# Patient Record
Sex: FEMALE | Race: WHITE | Employment: OTHER | URBAN - METROPOLITAN AREA
[De-identification: names, ages, dates, MRNs, and addresses within clinical notes are randomized per-mention and may not be internally consistent; named-entity substitution may affect disease eponyms.]

---

## 2023-09-01 ENCOUNTER — OFFICE VISIT (OUTPATIENT)
Dept: FAMILY MEDICINE CLINIC | Age: 68
End: 2023-09-01

## 2023-09-01 VITALS
SYSTOLIC BLOOD PRESSURE: 110 MMHG | RESPIRATION RATE: 16 BRPM | HEART RATE: 86 BPM | OXYGEN SATURATION: 98 % | BODY MASS INDEX: 25.3 KG/M2 | DIASTOLIC BLOOD PRESSURE: 83 MMHG | HEIGHT: 61 IN | WEIGHT: 134 LBS | TEMPERATURE: 98.1 F

## 2023-09-01 DIAGNOSIS — Z72.0 TOBACCO ABUSE: ICD-10-CM

## 2023-09-01 DIAGNOSIS — Z11.59 NEED FOR HEPATITIS C SCREENING TEST: ICD-10-CM

## 2023-09-01 DIAGNOSIS — K21.9 GASTROESOPHAGEAL REFLUX DISEASE, UNSPECIFIED WHETHER ESOPHAGITIS PRESENT: ICD-10-CM

## 2023-09-01 DIAGNOSIS — Z85.3 HX: BREAST CANCER: ICD-10-CM

## 2023-09-01 DIAGNOSIS — B96.89 ACUTE BACTERIAL SINUSITIS: Primary | ICD-10-CM

## 2023-09-01 DIAGNOSIS — Z13.220 LIPID SCREENING: ICD-10-CM

## 2023-09-01 DIAGNOSIS — Z87.891 PERSONAL HISTORY OF TOBACCO USE: ICD-10-CM

## 2023-09-01 DIAGNOSIS — Z76.0 MEDICATION REFILL: ICD-10-CM

## 2023-09-01 DIAGNOSIS — Z86.39 HX OF OBESITY: ICD-10-CM

## 2023-09-01 DIAGNOSIS — J01.90 ACUTE BACTERIAL SINUSITIS: Primary | ICD-10-CM

## 2023-09-01 DIAGNOSIS — Z13.1 DIABETES MELLITUS SCREENING: ICD-10-CM

## 2023-09-01 DIAGNOSIS — N95.2 ATROPHIC VAGINITIS: ICD-10-CM

## 2023-09-01 DIAGNOSIS — Z78.0 POSTMENOPAUSAL: ICD-10-CM

## 2023-09-01 LAB
CHOLESTEROL: 237 MG/DL
HBA1C MFR BLD: 5.6 % (ref 4–5.6)
HDLC SERPL-MCNC: 60 MG/DL
LDL CHOLESTEROL: 155 MG/DL
TRIGL SERPL-MCNC: 112 MG/DL
VLDLC SERPL CALC-MCNC: 22 MG/DL

## 2023-09-01 RX ORDER — AMOXICILLIN AND CLAVULANATE POTASSIUM 875; 125 MG/1; MG/1
1 TABLET, FILM COATED ORAL 2 TIMES DAILY
Qty: 14 TABLET | Refills: 0 | Status: SHIPPED | OUTPATIENT
Start: 2023-09-01 | End: 2023-09-08

## 2023-09-01 RX ORDER — FLUTICASONE PROPIONATE 50 MCG
2 SPRAY, SUSPENSION (ML) NASAL DAILY
Qty: 48 G | Refills: 1 | Status: SHIPPED | OUTPATIENT
Start: 2023-09-01

## 2023-09-01 RX ORDER — CONJUGATED ESTROGENS 0.62 MG/G
CREAM VAGINAL
Qty: 30 G | Refills: 0 | Status: SHIPPED | OUTPATIENT
Start: 2023-09-01

## 2023-09-01 RX ORDER — CIPROFLOXACIN AND DEXAMETHASONE 3; 1 MG/ML; MG/ML
4 SUSPENSION/ DROPS AURICULAR (OTIC) AS NEEDED
COMMUNITY

## 2023-09-01 RX ORDER — ALBUTEROL SULFATE 90 UG/1
2 AEROSOL, METERED RESPIRATORY (INHALATION) 4 TIMES DAILY PRN
Qty: 18 G | Refills: 0 | Status: SHIPPED | OUTPATIENT
Start: 2023-09-01

## 2023-09-01 RX ORDER — PANTOPRAZOLE SODIUM 40 MG/1
40 TABLET, DELAYED RELEASE ORAL DAILY
Qty: 90 TABLET | Refills: 1 | Status: SHIPPED | OUTPATIENT
Start: 2023-09-01

## 2023-09-01 SDOH — ECONOMIC STABILITY: FOOD INSECURITY: WITHIN THE PAST 12 MONTHS, YOU WORRIED THAT YOUR FOOD WOULD RUN OUT BEFORE YOU GOT MONEY TO BUY MORE.: NEVER TRUE

## 2023-09-01 SDOH — ECONOMIC STABILITY: HOUSING INSECURITY
IN THE LAST 12 MONTHS, WAS THERE A TIME WHEN YOU DID NOT HAVE A STEADY PLACE TO SLEEP OR SLEPT IN A SHELTER (INCLUDING NOW)?: NO

## 2023-09-01 SDOH — ECONOMIC STABILITY: FOOD INSECURITY: WITHIN THE PAST 12 MONTHS, THE FOOD YOU BOUGHT JUST DIDN'T LAST AND YOU DIDN'T HAVE MONEY TO GET MORE.: NEVER TRUE

## 2023-09-01 SDOH — ECONOMIC STABILITY: INCOME INSECURITY: HOW HARD IS IT FOR YOU TO PAY FOR THE VERY BASICS LIKE FOOD, HOUSING, MEDICAL CARE, AND HEATING?: SOMEWHAT HARD

## 2023-09-01 ASSESSMENT — ENCOUNTER SYMPTOMS
COUGH: 1
WHEEZING: 1
SHORTNESS OF BREATH: 1

## 2023-09-01 ASSESSMENT — PATIENT HEALTH QUESTIONNAIRE - PHQ9
8. MOVING OR SPEAKING SO SLOWLY THAT OTHER PEOPLE COULD HAVE NOTICED. OR THE OPPOSITE, BEING SO FIGETY OR RESTLESS THAT YOU HAVE BEEN MOVING AROUND A LOT MORE THAN USUAL: 0
9. THOUGHTS THAT YOU WOULD BE BETTER OFF DEAD, OR OF HURTING YOURSELF: 0
4. FEELING TIRED OR HAVING LITTLE ENERGY: 0
6. FEELING BAD ABOUT YOURSELF - OR THAT YOU ARE A FAILURE OR HAVE LET YOURSELF OR YOUR FAMILY DOWN: 0
2. FEELING DOWN, DEPRESSED OR HOPELESS: 1
SUM OF ALL RESPONSES TO PHQ9 QUESTIONS 1 & 2: 1
SUM OF ALL RESPONSES TO PHQ QUESTIONS 1-9: 3
1. LITTLE INTEREST OR PLEASURE IN DOING THINGS: 0
5. POOR APPETITE OR OVEREATING: 0
SUM OF ALL RESPONSES TO PHQ QUESTIONS 1-9: 3
SUM OF ALL RESPONSES TO PHQ QUESTIONS 1-9: 3
10. IF YOU CHECKED OFF ANY PROBLEMS, HOW DIFFICULT HAVE THESE PROBLEMS MADE IT FOR YOU TO DO YOUR WORK, TAKE CARE OF THINGS AT HOME, OR GET ALONG WITH OTHER PEOPLE: 0
7. TROUBLE CONCENTRATING ON THINGS, SUCH AS READING THE NEWSPAPER OR WATCHING TELEVISION: 1
3. TROUBLE FALLING OR STAYING ASLEEP: 1
SUM OF ALL RESPONSES TO PHQ QUESTIONS 1-9: 3

## 2023-09-01 NOTE — PROGRESS NOTES
This patient was screened with SUBS screening tool.    On 9/1/2023 the patient has a Positive Screen and the result can be found scanned into the chart

## 2023-09-06 ENCOUNTER — TELEPHONE (OUTPATIENT)
Dept: BREAST CENTER | Age: 68
End: 2023-09-06

## 2023-09-06 ASSESSMENT — ENCOUNTER SYMPTOMS
CHEST TIGHTNESS: 0
NAUSEA: 0
RHINORRHEA: 1
SORE THROAT: 0
BACK PAIN: 1
VOMITING: 0
DIARRHEA: 0
ABDOMINAL PAIN: 0

## 2023-09-06 NOTE — TELEPHONE ENCOUNTER
RN made first attempt to schedule patient for consult with [facility] breast clinic. Pt was referred to office for hx breast cancer, establish care by PCP office. RN left voicemail with office contact information for patient to call back and schedule referral appt. Was going to confirm if patient had reconstruction? When surgeries were? Who was surgeon? Where care previously was?         Electronically signed by Jairon Chowdhury RN on 9/6/23 at 3:07 PM EDT

## 2023-09-07 LAB — HEPATITIS C ANTIBODY: NONREACTIVE

## 2023-09-08 NOTE — TELEPHONE ENCOUNTER
RN received return call from patient. Patient had bilateral mastectomies with no recon and ALND on left side. Patient surgery was done in 2019 in [place]. Patient was scheduled with AMMY Milligan on 11/9/2023 @ 11am. Patient aware to bring her prior information for us to place in chart. Patient wants to talk about possible referral to plastic surgery to discuss reconstruction.       Electronically signed by Karson Chen RN on 9/8/23 at 10:34 AM EDT

## 2023-09-21 ENCOUNTER — TELEPHONE (OUTPATIENT)
Dept: FAMILY MEDICINE CLINIC | Age: 68
End: 2023-09-21

## 2023-09-21 NOTE — TELEPHONE ENCOUNTER
Patient injured her right hand and is in pain. She had surgery with Dr. Mary Garcia in 2015, but he has left his practice and moved out of town. She saw Dr. Mihir Mancera in September and is seeing Dr. Lei Hardy in October because she would like a D.O. She wonders if she has to wait until her visit on Oct. 10th, or can someone send a referral to a hand surgeon such as Dr. George Washburn.

## 2023-09-22 NOTE — TELEPHONE ENCOUNTER
Spoke with patient about needing to see Dr Edwina Moon so she can put in for an ortho referral. She said that she will wait for her appointment on October 10th.

## 2023-10-09 ENCOUNTER — HOSPITAL ENCOUNTER (OUTPATIENT)
Dept: PULMONOLOGY | Age: 68
Discharge: HOME | End: 2023-10-09
Payer: COMMERCIAL

## 2023-10-09 ENCOUNTER — HOSPITAL ENCOUNTER (OUTPATIENT)
Dept: CT IMAGING | Age: 68
Discharge: HOME | End: 2023-10-11
Payer: COMMERCIAL

## 2023-10-09 ENCOUNTER — HOSPITAL ENCOUNTER (OUTPATIENT)
Dept: MAMMOGRAPHY | Age: 68
Discharge: HOME | End: 2023-10-11
Payer: COMMERCIAL

## 2023-10-09 DIAGNOSIS — Z72.0 TOBACCO ABUSE: ICD-10-CM

## 2023-10-09 DIAGNOSIS — Z78.0 POSTMENOPAUSAL: ICD-10-CM

## 2023-10-09 PROCEDURE — 94729 DIFFUSING CAPACITY: CPT

## 2023-10-09 PROCEDURE — 71271 CT THORAX LUNG CANCER SCR C-: CPT

## 2023-10-09 PROCEDURE — 77080 DXA BONE DENSITY AXIAL: CPT

## 2023-10-09 PROCEDURE — 94060 EVALUATION OF WHEEZING: CPT

## 2023-10-09 PROCEDURE — 94726 PLETHYSMOGRAPHY LUNG VOLUMES: CPT

## 2023-10-10 ENCOUNTER — OFFICE VISIT (OUTPATIENT)
Dept: FAMILY MEDICINE CLINIC | Age: 68
End: 2023-10-10
Payer: COMMERCIAL

## 2023-10-10 ENCOUNTER — TELEPHONE (OUTPATIENT)
Dept: FAMILY MEDICINE CLINIC | Age: 68
End: 2023-10-10

## 2023-10-10 ENCOUNTER — TELEPHONE (OUTPATIENT)
Dept: CASE MANAGEMENT | Age: 68
End: 2023-10-10

## 2023-10-10 VITALS
TEMPERATURE: 97.4 F | RESPIRATION RATE: 18 BRPM | HEART RATE: 95 BPM | BODY MASS INDEX: 26.81 KG/M2 | WEIGHT: 142 LBS | OXYGEN SATURATION: 97 % | HEIGHT: 61 IN | DIASTOLIC BLOOD PRESSURE: 89 MMHG | SYSTOLIC BLOOD PRESSURE: 134 MMHG

## 2023-10-10 DIAGNOSIS — Z86.39 HISTORY OF VITAMIN D DEFICIENCY: ICD-10-CM

## 2023-10-10 DIAGNOSIS — Z00.00 INITIAL MEDICARE ANNUAL WELLNESS VISIT: Primary | ICD-10-CM

## 2023-10-10 DIAGNOSIS — Z91.89 CANDIDATE FOR STATIN THERAPY DUE TO RISK OF FUTURE CARDIOVASCULAR EVENT: ICD-10-CM

## 2023-10-10 DIAGNOSIS — Z91.89 STREPTOCOCCUS PNEUMONIAE VACCINATION INDICATED: ICD-10-CM

## 2023-10-10 DIAGNOSIS — Z98.890 HISTORY OF SURGERY ON ARM: ICD-10-CM

## 2023-10-10 DIAGNOSIS — K21.9 GASTROESOPHAGEAL REFLUX DISEASE, UNSPECIFIED WHETHER ESOPHAGITIS PRESENT: ICD-10-CM

## 2023-10-10 PROCEDURE — 90471 IMMUNIZATION ADMIN: CPT | Performed by: FAMILY MEDICINE

## 2023-10-10 PROCEDURE — 3074F SYST BP LT 130 MM HG: CPT

## 2023-10-10 PROCEDURE — G0438 PPPS, INITIAL VISIT: HCPCS

## 2023-10-10 PROCEDURE — 90677 PCV20 VACCINE IM: CPT | Performed by: FAMILY MEDICINE

## 2023-10-10 PROCEDURE — 1123F ACP DISCUSS/DSCN MKR DOCD: CPT

## 2023-10-10 PROCEDURE — 3078F DIAST BP <80 MM HG: CPT

## 2023-10-10 RX ORDER — ROSUVASTATIN CALCIUM 10 MG/1
10 TABLET, COATED ORAL NIGHTLY
Qty: 30 TABLET | Refills: 2 | Status: SHIPPED | OUTPATIENT
Start: 2023-10-10

## 2023-10-10 RX ORDER — FAMOTIDINE 20 MG/1
20 TABLET, FILM COATED ORAL PRN
Qty: 30 TABLET | Refills: 2 | Status: SHIPPED | OUTPATIENT
Start: 2023-10-10

## 2023-10-10 ASSESSMENT — PATIENT HEALTH QUESTIONNAIRE - PHQ9
SUM OF ALL RESPONSES TO PHQ QUESTIONS 1-9: 2
1. LITTLE INTEREST OR PLEASURE IN DOING THINGS: 0
8. MOVING OR SPEAKING SO SLOWLY THAT OTHER PEOPLE COULD HAVE NOTICED. OR THE OPPOSITE, BEING SO FIGETY OR RESTLESS THAT YOU HAVE BEEN MOVING AROUND A LOT MORE THAN USUAL: 0
10. IF YOU CHECKED OFF ANY PROBLEMS, HOW DIFFICULT HAVE THESE PROBLEMS MADE IT FOR YOU TO DO YOUR WORK, TAKE CARE OF THINGS AT HOME, OR GET ALONG WITH OTHER PEOPLE: 0
6. FEELING BAD ABOUT YOURSELF - OR THAT YOU ARE A FAILURE OR HAVE LET YOURSELF OR YOUR FAMILY DOWN: 1
SUM OF ALL RESPONSES TO PHQ QUESTIONS 1-9: 2
3. TROUBLE FALLING OR STAYING ASLEEP: 0
5. POOR APPETITE OR OVEREATING: 0
4. FEELING TIRED OR HAVING LITTLE ENERGY: 0
9. THOUGHTS THAT YOU WOULD BE BETTER OFF DEAD, OR OF HURTING YOURSELF: 0
2. FEELING DOWN, DEPRESSED OR HOPELESS: 1
SUM OF ALL RESPONSES TO PHQ9 QUESTIONS 1 & 2: 1
7. TROUBLE CONCENTRATING ON THINGS, SUCH AS READING THE NEWSPAPER OR WATCHING TELEVISION: 0

## 2023-10-10 NOTE — PROGRESS NOTES
Medicare Annual Wellness Visit    Ricki Sun is here for Medicare AWV and Other (Referral for arm. 2016 surgery)    Assessment & Plan   Initial Medicare annual wellness visit  History of surgery on arm  -     Mikal Medrano MD, Orthopaedics (hand and upper extremities), Debra ([facility]ARAN)  Streptococcus pneumoniae vaccination indicated  -     Pneumococcal, PCV20, PREVNAR 21, (age 6w+), IM, PF  Candidate for statin therapy due to risk of future cardiovascular event  -     Comprehensive Metabolic Panel; Future  -     rosuvastatin (CRESTOR) 10 MG tablet; Take 1 tablet by mouth nightly, Disp-30 tablet, R-2Normal  Gastroesophageal reflux disease, unspecified whether esophagitis present  -     famotidine (PEPCID) 20 MG tablet; Take 1 tablet by mouth as needed (as needed for reflux), Disp-30 tablet, R-2Normal  History of vitamin D deficiency  -     Vitamin D 25 Hydroxy; Future      Follow up in three months or sooner if needed    Recommendations for Preventive Services Due: see orders and patient instructions/AVS.  Recommended screening schedule for the next 5-10 years is provided to the patient in written form: see Patient Instructions/AVS.     Return in 3 months (on 1/10/2024) for HLD, statin. Subjective     Dallin in right arm causing pain, hx right forearm surgery [id number]; by Dr. Mariluz Boyce, needs arm surgeon, now with exercising at home, has hand weights, feeling numbness and tingling in fourth and fifth digit, would like to be seen by surgery again    Pneumonia vaccine 5/2018, wants second shot today, will get covid/flu at pharmacy     GERD- ppi taking prn, feels symptoms controlled    breast CA- Mckinley Angelucci, will follow up for lymphedema, mammo, Prolia    COPD- PFT ordered, albuterol prn; breathing controlled    Stopped taking blood pressure medications, doesn't like medications    Patient's complete Health Risk Assessment and screening values have been reviewed and are found in Flowsheets.  The following problems were

## 2023-10-10 NOTE — PROCEDURES
[address]                  [address] [facility], [address] [facility]                               PULMONARY FUNCTION    PATIENT NAME: Danie Dorantes                     :        1955  MED REC NO:   [id number]                            ROOM:  ACCOUNT NO:   [de-identified]                           ADMIT DATE: 10/09/2023  PROVIDER:     Cheyanne Villalobos MD    DATE OF PROCEDURE:  10/09/2023    INTERPRETATION:  This is a good patient effort and cooperation. FEV1/FVC ratio is 77% of predicted. FEV1 is 2.20 liters, 107% of  predicted. There is no bronchodilator response. Forced vital capacity  postbronchodilator is 2.96 liters, 113% of predicted. Mid flows are  102% of predicted. MVV is 82% of predicted. The total lung capacity is  4.23 liters, 91% of predicted. RV/TLC ratio is 80% of predicted. Diffusion capacity corrected for hemoglobin is 19.95 mL per minute per  mmHg, 112% of predicted. The flow-volume loop does not demonstrate any  intra or extrathoracic obstruction. IMPRESSION:  1. Spirometry reveals no airflow obstruction. 2.  There is no bronchodilator response. 3.  Lung volumes are normal.  4.  The diffusion capacity is normal.  5.  Normal PFT.       Cheyanne Villalobos MD    D: 10/09/2023 13:56:33       T: 10/09/2023 22:21:41     [email or web address]  Job#: [id number]     Doc#: [id number]

## 2023-10-10 NOTE — TELEPHONE ENCOUNTER
No call, encounter opened to process CT Lung Screening. CT Lung Screen: 10/9/2023  IMPRESSION:  1. There is no pulmonary infiltrate, mass or suspicious pulmonary nodule  2. Emphysematous changes     LUNG RADS:  Lung-RADS 1 - Negative ()     Management:  12 month screening LDCT     RECOMMENDATIONS:  If you would like to register your patient with the [facility], please contact the Nurse Navigator at  [phone]. Pack years: 17.5    Social History     Tobacco Use  Smoking Status: Current Every Day Smoker    Start Date:    Quit Date:    Types: Cigarettes   Packs/Day: 0.5   Years: 28   Pack Years: 17.5   Smokeless Tobacco: Never         Results letter sent to patient via my chart or mailed.      [address]

## 2023-10-11 LAB
ALBUMIN SERPL-MCNC: 4.5 G/DL (ref 3.5–5.2)
ALP BLD-CCNC: 114 U/L (ref 35–104)
ALT SERPL-CCNC: 14 U/L (ref 0–32)
ANION GAP SERPL CALCULATED.3IONS-SCNC: 17 MMOL/L (ref 7–16)
AST SERPL-CCNC: 21 U/L (ref 0–31)
BILIRUB SERPL-MCNC: 0.6 MG/DL (ref 0–1.2)
BUN BLDV-MCNC: 18 MG/DL (ref 6–23)
CALCIUM SERPL-MCNC: 10.1 MG/DL (ref 8.6–10.2)
CHLORIDE BLD-SCNC: 102 MMOL/L (ref 98–107)
CO2: 20 MMOL/L (ref 22–29)
CREAT SERPL-MCNC: 0.7 MG/DL (ref 0.5–1)
GFR SERPL CREATININE-BSD FRML MDRD: >60 ML/MIN/1.73M2
GLUCOSE BLD-MCNC: 80 MG/DL (ref 74–99)
POTASSIUM SERPL-SCNC: 3.9 MMOL/L (ref 3.5–5)
SODIUM BLD-SCNC: 139 MMOL/L (ref 132–146)
TOTAL PROTEIN: 7.4 G/DL (ref 6.4–8.3)
VITAMIN D 25-HYDROXY: 44.6 NG/ML (ref 30–100)

## 2023-10-17 NOTE — PROGRESS NOTES
LVM that her lab work is largely unremarkable. It showed that your liver enzyme was a little elevated, this has been the case previously and we will continue to monitor it.   And your vitamin D level was normal.

## 2023-10-19 ENCOUNTER — TELEPHONE (OUTPATIENT)
Dept: FAMILY MEDICINE CLINIC | Age: 68
End: 2023-10-19

## 2023-10-19 NOTE — TELEPHONE ENCOUNTER
----- Message from Kellie Macario sent at 10/19/2023 10:23 AM EDT -----  Subject: Referral Request    Reason for referral request? We referred pt over to hand doctor but they   are not covered by her insurance. She needs a referral to a different hand   doctor that is covered by her insurance. Please advise. Provider patient wants to be referred to(if known):     Provider Phone Number(if known):     Additional Information for Provider?   ---------------------------------------------------------------------------  --------------  [address]    [phone]; OK to leave message on voicemail  ---------------------------------------------------------------------------  --------------

## 2023-10-25 ENCOUNTER — TELEPHONE (OUTPATIENT)
Dept: FAMILY MEDICINE CLINIC | Age: 68
End: 2023-10-25

## 2023-10-25 NOTE — TELEPHONE ENCOUNTER
Spoke with patient to call and be seen by whichever provider she chooses, I recommend one of the providers upstairs at [phone].   She asked if she needed a referral?

## 2023-10-26 NOTE — TELEPHONE ENCOUNTER
Spoke with patient about referral to OB. She will call her insurance to check and will let us know who her referral will be with.  Thank you

## 2023-10-30 ASSESSMENT — ENCOUNTER SYMPTOMS
CONSTIPATION: 0
RESPIRATORY NEGATIVE: 1
DIARRHEA: 0
CHEST TIGHTNESS: 0
ABDOMINAL PAIN: 0
BACK PAIN: 0
SHORTNESS OF BREATH: 0
ANAL BLEEDING: 0
NAUSEA: 0
CHOKING: 0
ABDOMINAL DISTENTION: 0
VOMITING: 0
COUGH: 0
WHEEZING: 0

## 2023-10-30 NOTE — PROGRESS NOTES
Subjective:      Patient ID: Korey Miles is a 76 y.o. female. [facility]  Medical Breast consult    REFERRAL:  She is seen at the request of: Referring provider Dr. Fabian Hernandez:   Personal history bilateral breast cancer. 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 She returned to [place] in 2023 to take care of her foster mom. HISTORY of PRESENT ILLNESS:  Korey Miles is a 76y.o. year old woman with a past medical history of essential hypertension, GERD, history of suicide attempt in her 25s, bipolar disorder, hyperlipidemia, COPD with tobacco use disorder, chronic back pain, and history of bilateral breast cancer. She also reports a history of \"vaginal\" cancer in . Review of records indicate a Pap smear on 09/15/2015 revealed low-grade squamous intraepithelial lesion/mild dysplasia with HPV cytopathic effect. She presents today to establish care for her history of bilateral breast cancer which was diagnosed while living in [place] in 2019 post bilateral mastectomies. She reports she underwent adjuvant chemotherapy for approximately 6 months. Was recommended radiation therapy in the postmastectomy setting to the left breast but she declined. She was on endocrine therapy from  to May 2023 at which time she decided to stop it as she reports she was advised it could interfere with her hormones/premarin topical.    Risks for breast cancer include age, gender, sister with breast cancer at age 48. Sister with history of melanoma and metastatic disease to the brain at age 61, and a nephew with gastric cancer/GIST at age 47. Menarche age [age]. Menopause age 48. . First live birth age 25. She did not breast-feed.     She reports today she interested in delayed breast reconstruction    The left breast mass was

## 2023-11-03 ENCOUNTER — TELEPHONE (OUTPATIENT)
Dept: FAMILY MEDICINE CLINIC | Age: 68
End: 2023-11-03

## 2023-11-03 ENCOUNTER — OFFICE VISIT (OUTPATIENT)
Dept: FAMILY MEDICINE CLINIC | Age: 68
End: 2023-11-03

## 2023-11-03 VITALS
SYSTOLIC BLOOD PRESSURE: 120 MMHG | HEIGHT: 61 IN | HEART RATE: 97 BPM | OXYGEN SATURATION: 98 % | TEMPERATURE: 97.8 F | BODY MASS INDEX: 26.81 KG/M2 | RESPIRATION RATE: 16 BRPM | DIASTOLIC BLOOD PRESSURE: 77 MMHG | WEIGHT: 142 LBS

## 2023-11-03 DIAGNOSIS — Z98.890 HISTORY OF SURGERY ON ARM: Primary | ICD-10-CM

## 2023-11-03 DIAGNOSIS — Z86.69 HX OF TINNITUS: ICD-10-CM

## 2023-11-03 DIAGNOSIS — J01.90 ACUTE BACTERIAL SINUSITIS: ICD-10-CM

## 2023-11-03 DIAGNOSIS — H92.02 LEFT EAR PAIN: Primary | ICD-10-CM

## 2023-11-03 DIAGNOSIS — B96.89 ACUTE BACTERIAL SINUSITIS: ICD-10-CM

## 2023-11-03 DIAGNOSIS — R05.1 ACUTE COUGH: ICD-10-CM

## 2023-11-03 DIAGNOSIS — Z76.0 MEDICATION REFILL: ICD-10-CM

## 2023-11-03 DIAGNOSIS — J44.1 COPD EXACERBATION (HCC): Primary | ICD-10-CM

## 2023-11-03 RX ORDER — IPRATROPIUM BROMIDE 42 UG/1
2 SPRAY, METERED NASAL 4 TIMES DAILY
Qty: 15 ML | Refills: 3 | Status: SHIPPED | OUTPATIENT
Start: 2023-11-03

## 2023-11-03 RX ORDER — PREDNISONE 20 MG/1
40 TABLET ORAL DAILY
Qty: 10 TABLET | Refills: 0 | Status: SHIPPED | OUTPATIENT
Start: 2023-11-03 | End: 2023-11-08

## 2023-11-03 RX ORDER — GUAIFENESIN 600 MG/1
600 TABLET, EXTENDED RELEASE ORAL 2 TIMES DAILY
Qty: 30 TABLET | Refills: 0 | Status: SHIPPED | OUTPATIENT
Start: 2023-11-03 | End: 2023-11-18

## 2023-11-03 RX ORDER — AZITHROMYCIN 250 MG/1
250 TABLET, FILM COATED ORAL SEE ADMIN INSTRUCTIONS
Qty: 6 TABLET | Refills: 0 | Status: SHIPPED | OUTPATIENT
Start: 2023-11-03 | End: 2023-11-08

## 2023-11-03 NOTE — TELEPHONE ENCOUNTER
Patient would like a referral to Karyle Bimler, DO at [facility] for her hand. Previous referral was not in network with her insurance.      Referral pending your review and signature

## 2023-11-03 NOTE — PROGRESS NOTES
[facility]  Department of Family Medicine  Family Medicine Residency Program      Patient: Ciarra Hewitt 76 y.o. female     Date of Service: 11/3/23      Chief complaint:   Chief Complaint   Patient presents with    Cough    Fever    Otalgia     bilateral    Fatigue       HISTORY OF PRESENTING ILLNESS     76 y.o. female presented to the clinic today for cc of cough, otalgia. Onset 1.5- 2 weeks  Cough- productive, clear  Producing a lot of phlegm   Tried Vicks inhaler with moderate improvement  Has trouble breathing, Vicks inhaler helped mildly with trouble breathing  Aches  Ears painful with pressure, there is ringing  Congestion head, chest   Feels fevers intermittently, last a couple days ago, no recorded temperatures.   Feeling cold, wearing layers  Took Ibuprofen/Tylenol helped but avoiding to take too much due to side effect to tinnitus  Has been taking baby aspirin, no improvement  Post nasal drip +  Has been resting, feeling more tired  Appetite varies  Not sleeping well  Is drinking water, apple juice    Hs increase home PRN Albuterol inhaler to 3-6x a day  Smoker 1/2 PPD per day    Health Maintenance:  Health Maintenance Due   Topic Date Due    Breast cancer screen  09/15/2017    Shingles vaccine (2 of 2) 02/04/2022    Flu vaccine (1) 08/01/2023    COVID-19 Vaccine (3 - 2023-24 season) 09/01/2023     Allergies:    Abilify [aripiprazole], Aripiprazole, Depakote [divalproex sodium], Doxycycline, Elemental sulfur, Erythromycin, Escitalopram oxalate, Geodon [ziprasidone hcl], Hydrocodone, Lamictal [lamotrigine], Prozac [fluoxetine hcl], Risperdal, Seroquel [quetiapine fumarate], Wellbutrin [bupropion hcl], Nicoderm [nicotine], and Senna  Past Medical History:      Diagnosis Date    Arthritis     Bipolar disorder ([address])     Cancer of female genital organ ([address])     h/o vaginal cancer [id number]    Chronic back pain     Chronic neck pain     Dr Eduardo Aranda     COPD (chronic obstructive

## 2023-11-06 ENCOUNTER — TELEPHONE (OUTPATIENT)
Dept: FAMILY MEDICINE CLINIC | Age: 68
End: 2023-11-06

## 2023-11-06 RX ORDER — ALBUTEROL SULFATE 90 UG/1
AEROSOL, METERED RESPIRATORY (INHALATION)
Qty: 18 G | Refills: 0 | Status: SHIPPED | OUTPATIENT
Start: 2023-11-06

## 2023-11-06 NOTE — TELEPHONE ENCOUNTER
Patient with erythromycin allergy in chart, listed as unspecified. If patient reports she is not allergic to erythromycin, then she should take the antibiotic and the chart needs corrected.

## 2023-11-06 NOTE — TELEPHONE ENCOUNTER
Patient asked for a referral for a hand doctor. Reason for referral request? We referred pt over to hand doctor but they   are not covered by her insurance. She needs a referral to a different hand   doctor that is covered by her insurance. Please advise. Provider patient wants to be referred to(if known):      Provider Phone Number(if known):      Additional Information for Provider?   ---------------------------------------------------------------------------  --------------  [address]     [phone]; OK to leave message on voicemail

## 2023-11-06 NOTE — TELEPHONE ENCOUNTER
She is wondering if she may be allergic to the Zithromax ordered for her the other day.  She stated that she has a lot of allergies and hasn't taken them yet

## 2023-11-07 ASSESSMENT — ENCOUNTER SYMPTOMS
TROUBLE SWALLOWING: 0
SINUS PRESSURE: 1
SHORTNESS OF BREATH: 1
SORE THROAT: 0
VOMITING: 0
COLOR CHANGE: 0
NAUSEA: 0
DIARRHEA: 0
COUGH: 1
SINUS PAIN: 1
ABDOMINAL PAIN: 0
CONSTIPATION: 0

## 2023-11-09 ENCOUNTER — OFFICE VISIT (OUTPATIENT)
Dept: BREAST CENTER | Age: 68
End: 2023-11-09
Payer: COMMERCIAL

## 2023-11-09 VITALS
HEIGHT: 61 IN | BODY MASS INDEX: 27.19 KG/M2 | TEMPERATURE: 98 F | WEIGHT: 144 LBS | HEART RATE: 95 BPM | SYSTOLIC BLOOD PRESSURE: 134 MMHG | DIASTOLIC BLOOD PRESSURE: 80 MMHG | OXYGEN SATURATION: 99 % | RESPIRATION RATE: 16 BRPM

## 2023-11-09 DIAGNOSIS — Z85.3 PERSONAL HISTORY OF BREAST CANCER: Primary | ICD-10-CM

## 2023-11-09 DIAGNOSIS — M79.605 PAIN IN BOTH LOWER EXTREMITIES: ICD-10-CM

## 2023-11-09 DIAGNOSIS — M79.604 PAIN IN BOTH LOWER EXTREMITIES: ICD-10-CM

## 2023-11-09 DIAGNOSIS — M79.622 PAIN IN LEFT AXILLA: ICD-10-CM

## 2023-11-09 DIAGNOSIS — R74.8 ALKALINE PHOSPHATASE ELEVATION: ICD-10-CM

## 2023-11-09 DIAGNOSIS — Z00.00 WELL WOMAN EXAM (NO GYNECOLOGICAL EXAM): ICD-10-CM

## 2023-11-09 PROCEDURE — 99203 OFFICE O/P NEW LOW 30 MIN: CPT | Performed by: NURSE PRACTITIONER

## 2023-11-09 NOTE — PATIENT INSTRUCTIONS
Patient will be scheduled for Left axillary ultrasound [facility] schedulers will contact patient.     NM Bone scan will be scheduled once authorization is obtained    Referral to Medical Oncology has been submitted

## 2023-11-10 ASSESSMENT — ENCOUNTER SYMPTOMS
SINUS PRESSURE: 0
RHINORRHEA: 0
SINUS PAIN: 0
SORE THROAT: 0

## 2023-11-15 ENCOUNTER — OFFICE VISIT (OUTPATIENT)
Dept: FAMILY MEDICINE CLINIC | Age: 68
End: 2023-11-15

## 2023-11-15 ENCOUNTER — TELEPHONE (OUTPATIENT)
Dept: BREAST CENTER | Age: 68
End: 2023-11-15

## 2023-11-15 VITALS
HEART RATE: 104 BPM | DIASTOLIC BLOOD PRESSURE: 80 MMHG | SYSTOLIC BLOOD PRESSURE: 126 MMHG | OXYGEN SATURATION: 96 % | BODY MASS INDEX: 27 KG/M2 | HEIGHT: 61 IN | TEMPERATURE: 97.4 F | WEIGHT: 143 LBS

## 2023-11-15 DIAGNOSIS — J06.9 ACUTE UPPER RESPIRATORY INFECTION, UNSPECIFIED: ICD-10-CM

## 2023-11-15 DIAGNOSIS — J44.1 COPD EXACERBATION (HCC): Primary | ICD-10-CM

## 2023-11-15 DIAGNOSIS — R51.9 SINUS HEADACHE: ICD-10-CM

## 2023-11-15 DIAGNOSIS — R09.81 NASAL CONGESTION: ICD-10-CM

## 2023-11-15 DIAGNOSIS — J01.90 ACUTE NON-RECURRENT SINUSITIS, UNSPECIFIED LOCATION: ICD-10-CM

## 2023-11-15 DIAGNOSIS — R09.82 POSTNASAL DRIP: ICD-10-CM

## 2023-11-15 RX ORDER — BENZONATATE 100 MG/1
100 CAPSULE ORAL 3 TIMES DAILY PRN
Qty: 21 CAPSULE | Refills: 0 | Status: SHIPPED | OUTPATIENT
Start: 2023-11-15 | End: 2023-11-22

## 2023-11-15 RX ORDER — AMOXICILLIN AND CLAVULANATE POTASSIUM 875; 125 MG/1; MG/1
1 TABLET, FILM COATED ORAL 2 TIMES DAILY
Qty: 20 TABLET | Refills: 0 | Status: SHIPPED | OUTPATIENT
Start: 2023-11-15 | End: 2023-11-25

## 2023-11-15 RX ORDER — PREDNISONE 10 MG/1
TABLET ORAL
Qty: 18 TABLET | Refills: 0 | Status: SHIPPED | OUTPATIENT
Start: 2023-11-15

## 2023-11-15 NOTE — PROGRESS NOTES
11/15/23  Go Sanchez : 1955 Sex: female  Age 76 y.o. Subjective:  Chief Complaint   Patient presents with    Headache    Otalgia    Sinus Problem     X3 weeks. HPI:   HPI  Go Sanchez , 76 y.o. female presents to express care for evaluation of cough, congestion, drainage, headache. The patient has had the symptoms ongoing for the last 3 weeks. The patient was given a prescription for prednisone, azithromycin. The patient states that she cannot take azithromycin and there was a miscommunication and she thought that they were sending over a medication. The patient is not having any fever, chills. Just does not seem to be improving although the cough is dying down. The patient is not having any abdominal pain, back pain, flank pain. No hemoptysis. ROS:   Unless otherwise stated in this report the patient's positive and negative responses for review of systems for constitutional, eyes, ENT, cardiovascular, respiratory, gastrointestinal, neurological, , musculoskeletal, and integument systems and related systems to the presenting problem are either stated in the history of present illness or were not pertinent or were negative for the symptoms and/or complaints related to the presenting medical problem. Positives and pertinent negatives as per HPI. All others reviewed and are negative.       PMH:     Past Medical History:   Diagnosis Date    Arthritis     Bipolar disorder ([address])     Breast cancer ([address]) 2019    bilateral    Cancer of female genital organ [facility])     h/o vaginal cancer [id number]    Chronic back pain     Chronic neck pain     Dr Rossi Van     COPD (chronic obstructive pulmonary disease) ([address])     Depression     Emphysema     GERD (gastroesophageal reflux disease)     Headache(784.0)     Hyperlipidemia     Hypertension     Osteoporosis     Dr. Iesha Edwards, Reclast injections       Past Surgical History:   Procedure Laterality Date    CERVICAL FUSION  2003    ACDF C4-5

## 2023-11-15 NOTE — TELEPHONE ENCOUNTER
No prior authorization required for NM Bone scan per [employer] health plan - call Ref Belem Monica. 11/15/23 251pm

## 2023-11-17 ENCOUNTER — TELEPHONE (OUTPATIENT)
Dept: BREAST CENTER | Age: 68
End: 2023-11-17

## 2023-11-17 NOTE — TELEPHONE ENCOUNTER
Patient is scheduled for NM Bone scan 12/7/23 Arrival 10:30am Debra Zavala's == patient has been notified

## 2023-11-20 LAB — SURGICAL PATHOLOGY REPORT: NORMAL

## 2023-11-24 ENCOUNTER — HOSPITAL ENCOUNTER (OUTPATIENT)
Dept: GENERAL RADIOLOGY | Age: 68
Discharge: HOME | End: 2023-11-24
Payer: COMMERCIAL

## 2023-11-24 VITALS — HEIGHT: 61 IN | WEIGHT: 143 LBS | BODY MASS INDEX: 27 KG/M2

## 2023-11-24 DIAGNOSIS — Z85.3 PERSONAL HISTORY OF BREAST CANCER: ICD-10-CM

## 2023-11-24 DIAGNOSIS — M79.622 PAIN IN LEFT AXILLA: ICD-10-CM

## 2023-11-24 PROCEDURE — 76882 US LMTD JT/FCL EVL NVASC XTR: CPT

## 2023-11-28 DIAGNOSIS — J01.90 ACUTE BACTERIAL SINUSITIS: ICD-10-CM

## 2023-11-28 DIAGNOSIS — Z76.0 MEDICATION REFILL: ICD-10-CM

## 2023-11-28 DIAGNOSIS — B96.89 ACUTE BACTERIAL SINUSITIS: ICD-10-CM

## 2023-11-28 DIAGNOSIS — Z91.89 CANDIDATE FOR STATIN THERAPY DUE TO RISK OF FUTURE CARDIOVASCULAR EVENT: ICD-10-CM

## 2023-11-28 RX ORDER — ALBUTEROL SULFATE 90 UG/1
AEROSOL, METERED RESPIRATORY (INHALATION)
Qty: 18 G | Refills: 0 | Status: SHIPPED
Start: 2023-11-28 | End: 2023-11-29 | Stop reason: SDUPTHER

## 2023-11-28 RX ORDER — ROSUVASTATIN CALCIUM 20 MG/1
20 TABLET, COATED ORAL NIGHTLY
Qty: 30 TABLET | Refills: 2 | Status: SHIPPED
Start: 2023-11-28 | End: 2023-11-29 | Stop reason: SDUPTHER

## 2023-11-29 DIAGNOSIS — Z91.89 CANDIDATE FOR STATIN THERAPY DUE TO RISK OF FUTURE CARDIOVASCULAR EVENT: ICD-10-CM

## 2023-11-29 DIAGNOSIS — J01.90 ACUTE BACTERIAL SINUSITIS: ICD-10-CM

## 2023-11-29 DIAGNOSIS — B96.89 ACUTE BACTERIAL SINUSITIS: ICD-10-CM

## 2023-11-29 DIAGNOSIS — Z76.0 MEDICATION REFILL: ICD-10-CM

## 2023-11-29 RX ORDER — ALBUTEROL SULFATE 90 UG/1
AEROSOL, METERED RESPIRATORY (INHALATION)
Qty: 18 G | Refills: 0 | Status: SHIPPED | OUTPATIENT
Start: 2023-11-29

## 2023-11-29 RX ORDER — ROSUVASTATIN CALCIUM 20 MG/1
20 TABLET, COATED ORAL NIGHTLY
Qty: 30 TABLET | Refills: 2 | Status: SHIPPED | OUTPATIENT
Start: 2023-11-29

## 2023-12-07 ENCOUNTER — HOSPITAL ENCOUNTER (OUTPATIENT)
Dept: NUCLEAR MEDICINE | Age: 68
Discharge: HOME | End: 2023-12-07
Payer: MEDICARE

## 2023-12-07 DIAGNOSIS — Z85.3 PERSONAL HISTORY OF BREAST CANCER: ICD-10-CM

## 2023-12-07 DIAGNOSIS — R74.8 ALKALINE PHOSPHATASE ELEVATION: ICD-10-CM

## 2023-12-07 DIAGNOSIS — M79.605 PAIN IN BOTH LOWER EXTREMITIES: ICD-10-CM

## 2023-12-07 DIAGNOSIS — M79.604 PAIN IN BOTH LOWER EXTREMITIES: ICD-10-CM

## 2023-12-07 PROCEDURE — A9503 TC99M MEDRONATE: HCPCS | Performed by: RADIOLOGY

## 2023-12-07 PROCEDURE — 3430000000 HC RX DIAGNOSTIC RADIOPHARMACEUTICAL: Performed by: RADIOLOGY

## 2023-12-07 PROCEDURE — 78306 BONE IMAGING WHOLE BODY: CPT | Performed by: NURSE PRACTITIONER

## 2023-12-07 RX ORDER — TC 99M MEDRONATE 20 MG/10ML
25 INJECTION, POWDER, LYOPHILIZED, FOR SOLUTION INTRAVENOUS
Status: COMPLETED | OUTPATIENT
Start: 2023-12-07 | End: 2023-12-07

## 2023-12-07 RX ADMIN — TC 99M MEDRONATE 25 MILLICURIE: 20 INJECTION, POWDER, LYOPHILIZED, FOR SOLUTION INTRAVENOUS at 11:10

## 2023-12-08 ENCOUNTER — TELEPHONE (OUTPATIENT)
Dept: FAMILY MEDICINE CLINIC | Age: 68
End: 2023-12-08

## 2023-12-08 NOTE — TELEPHONE ENCOUNTER
----- Message from Rosetta Rivera sent at 12/8/2023  1:18 PM EST -----  Subject: Message to Provider    QUESTIONS  Information for Provider? pt needs a paper mailed to her stating she had   to her. She needs a statement showing she had her well visit.   ---------------------------------------------------------------------------  --------------  Antoni January [facility]  [phone]; OK to leave message on voicemail  ---------------------------------------------------------------------------  --------------  SCRIPT ANSWERS  Relationship to Patient?  Self

## 2023-12-08 NOTE — TELEPHONE ENCOUNTER
Yes, she had her AWV on 10/10/2023.     Jorge Estevez DO  Family Medicine Resident, PGY-2  [address]  12/8/2023 1:50 PM

## 2023-12-11 ENCOUNTER — TELEPHONE (OUTPATIENT)
Dept: BREAST CENTER | Age: 68
End: 2023-12-11

## 2023-12-11 NOTE — TELEPHONE ENCOUNTER
GYN referral submitted to Dr Ezra Quiroga -- patient information has been faxed -- their office will contact patient

## 2023-12-11 NOTE — TELEPHONE ENCOUNTER
Left a voicemail for Romana Alba informing her that her bone scan was negative for metastatic disease. Possible old fracture. Copy sent to primary care. I reviewed her chart from her last visit and she had no complaints related to left rib discomfort therefore, I did not order a CT of the chest to further evaluate the 6th left rib. Nuvia Garcias) Dorcas Moreau, RN, MSN, APRN-CNP, [address] [facility].  Advanced Oncology Certified Nurse Practitioner  Department of Breast Surgery  [facility] Breast [facility]/  [facility] Emergency Department in collaboration with Dr. Joanie Douglas/Dr. Nathalie Dixon/Dr. Aleshia Mccormack APRN-CNP

## 2023-12-13 ENCOUNTER — TELEPHONE (OUTPATIENT)
Dept: CASE MANAGEMENT | Age: 68
End: 2023-12-13

## 2023-12-13 ENCOUNTER — HOSPITAL ENCOUNTER (OUTPATIENT)
Dept: INFUSION THERAPY | Age: 68
Discharge: HOME | End: 2023-12-13

## 2023-12-13 NOTE — TELEPHONE ENCOUNTER
Met with patient during her initial consultation with Dr. Domitila Miller for her breast cancer history. Introduced myself and explained my role with patients receiving treatment at our center. Patient was friendly and receptive. Completed nursing assessment for the center including medication review and medical, social, and surgical histories. Patient recently moved here from [place]. She had bilateral mastectomies in 2019 followed by adjuvant AC-T. She started Tamoxifen after this, which she stopped in May of 2023. She also declined radiation therapy. She will be started on Femara per Dr. Keven Amos's recommendations. Discussed additional ancillary services available at the center. Declines any referrals at this time. Provided patient with my contact information, office hours, and encouragement to call me with questions or concerns. Patient verbalizes understanding and appreciative of nurse navigator visit. Emotional support provided and greater than 30 minutes spent with patient. Nurse navigator will continue to follow.  LEROY KelleyN, RN, Oncology Nurse Navigator

## 2023-12-28 ENCOUNTER — OFFICE VISIT (OUTPATIENT)
Dept: FAMILY MEDICINE CLINIC | Age: 68
End: 2023-12-28
Payer: MEDICARE

## 2023-12-28 ENCOUNTER — HOSPITAL ENCOUNTER (EMERGENCY)
Age: 68
Discharge: HOME | End: 2023-12-28
Attending: EMERGENCY MEDICINE
Payer: MEDICARE

## 2023-12-28 ENCOUNTER — APPOINTMENT (OUTPATIENT)
Dept: CT IMAGING | Age: 68
End: 2023-12-28
Payer: MEDICARE

## 2023-12-28 ENCOUNTER — APPOINTMENT (OUTPATIENT)
Dept: MRI IMAGING | Age: 68
End: 2023-12-28
Payer: MEDICARE

## 2023-12-28 VITALS
TEMPERATURE: 97.7 F | OXYGEN SATURATION: 98 % | DIASTOLIC BLOOD PRESSURE: 90 MMHG | RESPIRATION RATE: 16 BRPM | HEART RATE: 89 BPM | WEIGHT: 142 LBS | SYSTOLIC BLOOD PRESSURE: 155 MMHG | BODY MASS INDEX: 26.81 KG/M2 | HEIGHT: 61 IN

## 2023-12-28 VITALS
TEMPERATURE: 98.2 F | HEART RATE: 88 BPM | RESPIRATION RATE: 16 BRPM | SYSTOLIC BLOOD PRESSURE: 140 MMHG | HEIGHT: 61 IN | BODY MASS INDEX: 26.81 KG/M2 | WEIGHT: 142 LBS | DIASTOLIC BLOOD PRESSURE: 92 MMHG | OXYGEN SATURATION: 98 %

## 2023-12-28 DIAGNOSIS — M48.56XA: Primary | ICD-10-CM

## 2023-12-28 DIAGNOSIS — M54.42 ACUTE MIDLINE LOW BACK PAIN WITH BILATERAL SCIATICA: Primary | ICD-10-CM

## 2023-12-28 DIAGNOSIS — M54.41 ACUTE MIDLINE LOW BACK PAIN WITH BILATERAL SCIATICA: Primary | ICD-10-CM

## 2023-12-28 DIAGNOSIS — M48.56XA NONTRAUMATIC COMPRESSION FRACTURE OF L5 VERTEBRA, INITIAL ENCOUNTER (HCC): ICD-10-CM

## 2023-12-28 PROCEDURE — 3077F SYST BP >= 140 MM HG: CPT | Performed by: STUDENT IN AN ORGANIZED HEALTH CARE EDUCATION/TRAINING PROGRAM

## 2023-12-28 PROCEDURE — 6360000002 HC RX W HCPCS

## 2023-12-28 PROCEDURE — 96375 TX/PRO/DX INJ NEW DRUG ADDON: CPT

## 2023-12-28 PROCEDURE — 99214 OFFICE O/P EST MOD 30 MIN: CPT | Performed by: STUDENT IN AN ORGANIZED HEALTH CARE EDUCATION/TRAINING PROGRAM

## 2023-12-28 PROCEDURE — 96372 THER/PROPH/DIAG INJ SC/IM: CPT

## 2023-12-28 PROCEDURE — 3080F DIAST BP >= 90 MM HG: CPT | Performed by: STUDENT IN AN ORGANIZED HEALTH CARE EDUCATION/TRAINING PROGRAM

## 2023-12-28 PROCEDURE — 1123F ACP DISCUSS/DSCN MKR DOCD: CPT | Performed by: STUDENT IN AN ORGANIZED HEALTH CARE EDUCATION/TRAINING PROGRAM

## 2023-12-28 PROCEDURE — 6360000002 HC RX W HCPCS: Performed by: EMERGENCY MEDICINE

## 2023-12-28 PROCEDURE — 96374 THER/PROPH/DIAG INJ IV PUSH: CPT

## 2023-12-28 PROCEDURE — 99284 EMERGENCY DEPT VISIT MOD MDM: CPT

## 2023-12-28 PROCEDURE — 72131 CT LUMBAR SPINE W/O DYE: CPT

## 2023-12-28 PROCEDURE — 72148 MRI LUMBAR SPINE W/O DYE: CPT

## 2023-12-28 PROCEDURE — 6370000000 HC RX 637 (ALT 250 FOR IP)

## 2023-12-28 RX ORDER — FENTANYL CITRATE 50 UG/ML
25 INJECTION, SOLUTION INTRAMUSCULAR; INTRAVENOUS ONCE
Status: COMPLETED | OUTPATIENT
Start: 2023-12-28 | End: 2023-12-28

## 2023-12-28 RX ORDER — TIZANIDINE 2 MG/1
2 TABLET ORAL NIGHTLY
Qty: 5 TABLET | Refills: 0 | Status: SHIPPED | OUTPATIENT
Start: 2023-12-28

## 2023-12-28 RX ORDER — PREDNISONE 20 MG/1
60 TABLET ORAL ONCE
Status: COMPLETED | OUTPATIENT
Start: 2023-12-28 | End: 2023-12-28

## 2023-12-28 RX ORDER — ORPHENADRINE CITRATE 30 MG/ML
60 INJECTION INTRAMUSCULAR; INTRAVENOUS ONCE
Status: COMPLETED | OUTPATIENT
Start: 2023-12-28 | End: 2023-12-28

## 2023-12-28 RX ORDER — KETOROLAC TROMETHAMINE 10 MG/1
10 TABLET, FILM COATED ORAL EVERY 6 HOURS PRN
Qty: 20 TABLET | Refills: 0 | Status: SHIPPED | OUTPATIENT
Start: 2023-12-28 | End: 2024-01-02

## 2023-12-28 RX ORDER — OXYCODONE HYDROCHLORIDE AND ACETAMINOPHEN 5; 325 MG/1; MG/1
1 TABLET ORAL ONCE
Status: COMPLETED | OUTPATIENT
Start: 2023-12-28 | End: 2023-12-28

## 2023-12-28 RX ORDER — KETOROLAC TROMETHAMINE 30 MG/ML
15 INJECTION, SOLUTION INTRAMUSCULAR; INTRAVENOUS ONCE
Status: COMPLETED | OUTPATIENT
Start: 2023-12-28 | End: 2023-12-28

## 2023-12-28 RX ORDER — MIDAZOLAM HYDROCHLORIDE 2 MG/2ML
0.5 INJECTION, SOLUTION INTRAMUSCULAR; INTRAVENOUS ONCE
Status: COMPLETED | OUTPATIENT
Start: 2023-12-28 | End: 2023-12-28

## 2023-12-28 RX ORDER — LIDOCAINE 50 MG/G
1 PATCH TOPICAL DAILY
Qty: 10 PATCH | Refills: 0 | Status: SHIPPED | OUTPATIENT
Start: 2023-12-28 | End: 2024-01-07

## 2023-12-28 RX ORDER — OXYCODONE HYDROCHLORIDE AND ACETAMINOPHEN 5; 325 MG/1; MG/1
1 TABLET ORAL EVERY 6 HOURS PRN
Qty: 12 TABLET | Refills: 0 | Status: SHIPPED | OUTPATIENT
Start: 2023-12-28 | End: 2023-12-31

## 2023-12-28 RX ADMIN — KETOROLAC TROMETHAMINE 15 MG: 30 INJECTION, SOLUTION INTRAMUSCULAR; INTRAVENOUS at 18:57

## 2023-12-28 RX ADMIN — ORPHENADRINE CITRATE 60 MG: 60 INJECTION INTRAMUSCULAR; INTRAVENOUS at 15:17

## 2023-12-28 RX ADMIN — PREDNISONE 60 MG: 20 TABLET ORAL at 15:17

## 2023-12-28 RX ADMIN — OXYCODONE AND ACETAMINOPHEN 1 TABLET: 5; 325 TABLET ORAL at 15:18

## 2023-12-28 RX ADMIN — FENTANYL CITRATE 25 MCG: 50 INJECTION INTRAMUSCULAR; INTRAVENOUS at 22:21

## 2023-12-28 RX ADMIN — MIDAZOLAM HYDROCHLORIDE 0.5 MG: 1 INJECTION, SOLUTION INTRAMUSCULAR; INTRAVENOUS at 21:01

## 2023-12-28 NOTE — ED PROVIDER NOTES
daily for 10 days 12 hours on, 12 hours off., Disp-10 patch, R-0Normal      tiZANidine (ZANAFLEX) 2 MG tablet Take 1 tablet by mouth at bedtime, Disp-5 tablet, R-0Normal           Electronically signed by JUAN ANTONIO Sin NP   DD: 12/28/23  **This report was transcribed using voice recognition software. Every effort was made to ensure accuracy; however, inadvertent computerized transcription errors may be present.   END OF ED PROVIDER NOTE      JUAN ANTONIO Sin NP  12/28/23 6728

## 2023-12-29 NOTE — DISCHARGE INSTRUCTIONS
Call Dr. Kev Vazquez tomorrow for follow-up. Take medications only as prescribed.  If anything worsens, come back to the emergency department

## 2024-01-03 ENCOUNTER — HOSPITAL ENCOUNTER (EMERGENCY)
Age: 69
Discharge: HOME | End: 2024-01-03
Payer: MEDICARE

## 2024-01-03 VITALS
DIASTOLIC BLOOD PRESSURE: 85 MMHG | RESPIRATION RATE: 16 BRPM | HEART RATE: 93 BPM | OXYGEN SATURATION: 98 % | TEMPERATURE: 98.5 F | SYSTOLIC BLOOD PRESSURE: 128 MMHG

## 2024-01-03 DIAGNOSIS — S32.050A COMPRESSION FRACTURE OF L5 LUMBAR VERTEBRA, CLOSED, INITIAL ENCOUNTER (HCC): ICD-10-CM

## 2024-01-03 DIAGNOSIS — S32.040A CLOSED COMPRESSION FRACTURE OF L4 LUMBAR VERTEBRA, INITIAL ENCOUNTER (HCC): Primary | ICD-10-CM

## 2024-01-03 LAB
ALBUMIN SERPL-MCNC: 4.2 G/DL (ref 3.5–5.2)
ALP SERPL-CCNC: 200 U/L (ref 35–104)
ALT SERPL-CCNC: 11 U/L (ref 0–32)
ANION GAP SERPL CALCULATED.3IONS-SCNC: 13 MMOL/L (ref 7–16)
AST SERPL-CCNC: 16 U/L (ref 0–31)
BASOPHILS # BLD: 0.03 K/UL (ref 0–0.2)
BASOPHILS NFR BLD: 0 % (ref 0–2)
BILIRUB SERPL-MCNC: 0.7 MG/DL (ref 0–1.2)
BUN SERPL-MCNC: 13 MG/DL (ref 6–23)
CALCIUM SERPL-MCNC: 9.9 MG/DL (ref 8.6–10.2)
CHLORIDE SERPL-SCNC: 102 MMOL/L (ref 98–107)
CO2 SERPL-SCNC: 24 MMOL/L (ref 22–29)
CREAT SERPL-MCNC: 0.6 MG/DL (ref 0.5–1)
EOSINOPHIL # BLD: 0.06 K/UL (ref 0.05–0.5)
EOSINOPHILS RELATIVE PERCENT: 1 % (ref 0–6)
ERYTHROCYTE [DISTWIDTH] IN BLOOD BY AUTOMATED COUNT: 14 % (ref 11.5–15)
GFR SERPL CREATININE-BSD FRML MDRD: >60 ML/MIN/1.73M2
GLUCOSE SERPL-MCNC: 94 MG/DL (ref 74–99)
HCT VFR BLD AUTO: 41.3 % (ref 34–48)
HGB BLD-MCNC: 14 G/DL (ref 11.5–15.5)
IMM GRANULOCYTES # BLD AUTO: 0.06 K/UL (ref 0–0.58)
IMM GRANULOCYTES NFR BLD: 1 % (ref 0–5)
LYMPHOCYTES NFR BLD: 1.29 K/UL (ref 1.5–4)
LYMPHOCYTES RELATIVE PERCENT: 10 % (ref 20–42)
MCH RBC QN AUTO: 28.2 PG (ref 26–35)
MCHC RBC AUTO-ENTMCNC: 33.9 G/DL (ref 32–34.5)
MCV RBC AUTO: 83.3 FL (ref 80–99.9)
MONOCYTES NFR BLD: 1 K/UL (ref 0.1–0.95)
MONOCYTES NFR BLD: 8 % (ref 2–12)
NEUTROPHILS NFR BLD: 81 % (ref 43–80)
NEUTS SEG NFR BLD: 10.19 K/UL (ref 1.8–7.3)
PLATELET # BLD AUTO: 304 K/UL (ref 130–450)
PMV BLD AUTO: 10 FL (ref 7–12)
POTASSIUM SERPL-SCNC: 4.2 MMOL/L (ref 3.5–5)
PROT SERPL-MCNC: 7.2 G/DL (ref 6.4–8.3)
RBC # BLD AUTO: 4.96 M/UL (ref 3.5–5.5)
SODIUM SERPL-SCNC: 139 MMOL/L (ref 132–146)
WBC OTHER # BLD: 12.6 K/UL (ref 4.5–11.5)

## 2024-01-03 PROCEDURE — 85025 COMPLETE CBC W/AUTO DIFF WBC: CPT

## 2024-01-03 PROCEDURE — 6370000000 HC RX 637 (ALT 250 FOR IP): Performed by: PHYSICIAN ASSISTANT

## 2024-01-03 PROCEDURE — 99283 EMERGENCY DEPT VISIT LOW MDM: CPT

## 2024-01-03 PROCEDURE — 80053 COMPREHEN METABOLIC PANEL: CPT

## 2024-01-03 RX ORDER — OXYCODONE HYDROCHLORIDE AND ACETAMINOPHEN 5; 325 MG/1; MG/1
2 TABLET ORAL ONCE
Status: COMPLETED | OUTPATIENT
Start: 2024-01-03 | End: 2024-01-03

## 2024-01-03 RX ORDER — OXYCODONE HYDROCHLORIDE AND ACETAMINOPHEN 5; 325 MG/1; MG/1
1 TABLET ORAL EVERY 6 HOURS PRN
Qty: 20 TABLET | Refills: 0 | Status: SHIPPED | OUTPATIENT
Start: 2024-01-03 | End: 2024-01-08

## 2024-01-03 RX ORDER — TIZANIDINE 2 MG/1
2 TABLET ORAL 3 TIMES DAILY PRN
Qty: 20 TABLET | Refills: 0 | Status: SHIPPED | OUTPATIENT
Start: 2024-01-03

## 2024-01-03 RX ADMIN — OXYCODONE AND ACETAMINOPHEN 2 TABLET: 5; 325 TABLET ORAL at 19:54

## 2024-01-03 ASSESSMENT — PAIN - FUNCTIONAL ASSESSMENT: PAIN_FUNCTIONAL_ASSESSMENT: 0-10

## 2024-01-03 ASSESSMENT — PAIN DESCRIPTION - DESCRIPTORS: DESCRIPTORS: SHARP

## 2024-01-03 ASSESSMENT — PAIN DESCRIPTION - LOCATION
LOCATION: BACK
LOCATION: BACK

## 2024-01-03 ASSESSMENT — PAIN DESCRIPTION - PAIN TYPE: TYPE: CHRONIC PAIN;ACUTE PAIN

## 2024-01-03 ASSESSMENT — PAIN DESCRIPTION - ORIENTATION: ORIENTATION: LOWER;RIGHT

## 2024-01-03 ASSESSMENT — PAIN SCALES - GENERAL
PAINLEVEL_OUTOF10: 10
PAINLEVEL_OUTOF10: 10
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
## 2024-01-04 NOTE — DISCHARGE INSTR - COC
Continuity of Care Form    Patient Name: Doreen Nunez   :  1955  MRN:  [id number]    Admit date:  1/3/2024  Discharge date:  ***    Code Status Order: Prior   Advance Directives:     Admitting Physician:  No admitting provider for patient encounter.  PCP: Lesli Cheung DO    Discharging Nurse: ***  Discharging Hospital Unit/Room#: 35/35  Discharging Unit Phone Number: ***    Emergency Contact:   Extended Emergency Contact Information  Primary Emergency Contact: Whitley Friend  Address: [place], [place] [address] [place] [address]  Home Phone: [phone]  Mobile Phone: [phone]  Relation: Child  Secondary Emergency Contact: Bella Jean           [facility]  Home Phone: [phone]  Mobile Phone: [phone]  Relation: Niece/Nephew    Past Surgical History:  Past Surgical History:   Procedure Laterality Date    BREAST BIOPSY      CERVICAL FUSION  2003    ACDF C4-5 & C5-6    COLONOSCOPY  2015    Dr. Harvey    ECHO COMPLETE  2013         FOREARM SURGERY Right     INNER EAR SURGERY      Artificial TM on left, partial on right    MASTECTOMY, BILATERAL      NECK SURGERY      OTHER SURGICAL HISTORY Right 2015    wrist arthroplasty w/debridement ulnar shortening osteotomy    TONSILLECTOMY      UPPER GASTROINTESTINAL ENDOSCOPY  2014, 2015    Dr. Jimenez       Immunization History:   Immunization History   Administered Date(s) Administered    COVID-19, PFIZER Bivalent, DO NOT Dilute, (age 12y+), IM, 30 mcg/0.3 mL 2021, 2021    COVID-19, PFIZER, (- formula), (age 12y+), IM, 30mcg/0.3mL 10/11/2023    Influenza A (B0K5-41) Vaccine IM 2016    Influenza Virus Vaccine 10/14/2013, 2016    Pneumococcal, PCV20, PREVNAR 20, (age 6w+), IM, 0.5mL 10/10/2023    Pneumococcal, PPSV23, PNEUMOVAX 23, (age 2y+), SC/IM, 0.5mL 2012, 2018    TDaP, ADACEL (age 10y-64y), BOOSTRIX (age 10y+), IM, 0.5mL 2016

## 2024-01-04 NOTE — ED PROVIDER NOTES
software. Every effort was made to ensure accuracy; however, inadvertent computerized transcription errors may be present.  END OF ED PROVIDER NOTE       Karen Claros PA-C  01/03/24 0331

## 2024-01-05 DIAGNOSIS — B96.89 ACUTE BACTERIAL SINUSITIS: ICD-10-CM

## 2024-01-05 DIAGNOSIS — J01.90 ACUTE BACTERIAL SINUSITIS: ICD-10-CM

## 2024-01-05 DIAGNOSIS — Z76.0 MEDICATION REFILL: ICD-10-CM

## 2024-01-08 RX ORDER — ALBUTEROL SULFATE 90 UG/1
AEROSOL, METERED RESPIRATORY (INHALATION)
Qty: 18 G | Refills: 0 | Status: SHIPPED | OUTPATIENT
Start: 2024-01-08

## 2024-01-09 ENCOUNTER — OFFICE VISIT (OUTPATIENT)
Dept: ENT CLINIC | Age: 69
End: 2024-01-09
Payer: MEDICARE

## 2024-01-09 ENCOUNTER — PROCEDURE VISIT (OUTPATIENT)
Dept: AUDIOLOGY | Age: 69
End: 2024-01-09
Payer: MEDICARE

## 2024-01-09 VITALS
WEIGHT: 142 LBS | HEART RATE: 104 BPM | BODY MASS INDEX: 26.81 KG/M2 | OXYGEN SATURATION: 97 % | TEMPERATURE: 97.4 F | DIASTOLIC BLOOD PRESSURE: 83 MMHG | HEIGHT: 61 IN | SYSTOLIC BLOOD PRESSURE: 124 MMHG

## 2024-01-09 DIAGNOSIS — H69.93 DYSFUNCTION OF BOTH EUSTACHIAN TUBES: Primary | ICD-10-CM

## 2024-01-09 DIAGNOSIS — H65.192 ACUTE EFFUSION OF LEFT EAR: ICD-10-CM

## 2024-01-09 DIAGNOSIS — H69.93 DYSFUNCTION OF BOTH EUSTACHIAN TUBES: ICD-10-CM

## 2024-01-09 DIAGNOSIS — H92.03 EAR PAIN, BILATERAL: Primary | ICD-10-CM

## 2024-01-09 PROCEDURE — 92567 TYMPANOMETRY: CPT

## 2024-01-09 PROCEDURE — 99203 OFFICE O/P NEW LOW 30 MIN: CPT

## 2024-01-09 PROCEDURE — 3079F DIAST BP 80-89 MM HG: CPT

## 2024-01-09 PROCEDURE — 3074F SYST BP LT 130 MM HG: CPT

## 2024-01-09 PROCEDURE — 1123F ACP DISCUSS/DSCN MKR DOCD: CPT

## 2024-01-09 RX ORDER — AMOXICILLIN 875 MG/1
0.5 TABLET, COATED ORAL 2 TIMES DAILY
COMMUNITY

## 2024-01-09 ASSESSMENT — ENCOUNTER SYMPTOMS
EYE PAIN: 0
DIARRHEA: 0
EYE DISCHARGE: 0
COUGH: 0
VOMITING: 0
RHINORRHEA: 0
SINUS PRESSURE: 0
SHORTNESS OF BREATH: 0
BACK PAIN: 0
SORE THROAT: 0
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
## 2024-01-09 NOTE — PROGRESS NOTES
today:  Agus Blank MSN, FNP-BC  [facility]    The information contained in this note has been dictated using drug and medical speech recognition software and may contain errors

## 2024-01-09 NOTE — PROGRESS NOTES
This patient was referred for tympanometric testing by SUNITA Sommer due to ear pain.       Tympanometry revealed shallow tympanogram, in the right ear and flat tympanogram, in the left ear.     The results were reviewed with the patient.     Recommendations for follow up will be made pending physician consult.    Helene Garcia/CCC-A  [place] Lic [id number]  Electronically signed by Helene Garcia on 1/9/2024 at 11:50 AM

## 2024-01-09 NOTE — PATIENT INSTRUCTIONS
Restart Flonase 1 spray to each nostril two times daily     Use nasal saline spray 2 sprays to each nostril 3-4 times daily

## 2024-01-10 ENCOUNTER — OFFICE VISIT (OUTPATIENT)
Dept: FAMILY MEDICINE CLINIC | Age: 69
End: 2024-01-10
Payer: MEDICARE

## 2024-01-10 VITALS
SYSTOLIC BLOOD PRESSURE: 111 MMHG | BODY MASS INDEX: 27.56 KG/M2 | DIASTOLIC BLOOD PRESSURE: 87 MMHG | OXYGEN SATURATION: 98 % | TEMPERATURE: 97.3 F | HEART RATE: 98 BPM | WEIGHT: 146 LBS | RESPIRATION RATE: 18 BRPM | HEIGHT: 61 IN

## 2024-01-10 DIAGNOSIS — K21.9 GASTROESOPHAGEAL REFLUX DISEASE, UNSPECIFIED WHETHER ESOPHAGITIS PRESENT: ICD-10-CM

## 2024-01-10 DIAGNOSIS — S32.040S CLOSED COMPRESSION FRACTURE OF L4 LUMBAR VERTEBRA, SEQUELA: Primary | ICD-10-CM

## 2024-01-10 DIAGNOSIS — L60.0 INGROWN TOENAIL: ICD-10-CM

## 2024-01-10 DIAGNOSIS — Z91.89 CANDIDATE FOR STATIN THERAPY DUE TO RISK OF FUTURE CARDIOVASCULAR EVENT: ICD-10-CM

## 2024-01-10 PROCEDURE — 99213 OFFICE O/P EST LOW 20 MIN: CPT

## 2024-01-10 PROCEDURE — 3074F SYST BP LT 130 MM HG: CPT

## 2024-01-10 PROCEDURE — 1123F ACP DISCUSS/DSCN MKR DOCD: CPT

## 2024-01-10 PROCEDURE — 3079F DIAST BP 80-89 MM HG: CPT

## 2024-01-10 RX ORDER — FAMOTIDINE 20 MG/1
20 TABLET, FILM COATED ORAL PRN
Qty: 90 TABLET | Refills: 0 | Status: SHIPPED | OUTPATIENT
Start: 2024-01-10

## 2024-01-10 RX ORDER — ROSUVASTATIN CALCIUM 20 MG/1
20 TABLET, COATED ORAL NIGHTLY
Qty: 90 TABLET | Refills: 0 | Status: SHIPPED | OUTPATIENT
Start: 2024-01-10

## 2024-01-10 ASSESSMENT — PATIENT HEALTH QUESTIONNAIRE - PHQ9
5. POOR APPETITE OR OVEREATING: 0
SUM OF ALL RESPONSES TO PHQ QUESTIONS 1-9: 2
1. LITTLE INTEREST OR PLEASURE IN DOING THINGS: 1
9. THOUGHTS THAT YOU WOULD BE BETTER OFF DEAD, OR OF HURTING YOURSELF: 0
SUM OF ALL RESPONSES TO PHQ QUESTIONS 1-9: 2
SUM OF ALL RESPONSES TO PHQ QUESTIONS 1-9: 2
6. FEELING BAD ABOUT YOURSELF - OR THAT YOU ARE A FAILURE OR HAVE LET YOURSELF OR YOUR FAMILY DOWN: 0
2. FEELING DOWN, DEPRESSED OR HOPELESS: 1
10. IF YOU CHECKED OFF ANY PROBLEMS, HOW DIFFICULT HAVE THESE PROBLEMS MADE IT FOR YOU TO DO YOUR WORK, TAKE CARE OF THINGS AT HOME, OR GET ALONG WITH OTHER PEOPLE: 0
SUM OF ALL RESPONSES TO PHQ9 QUESTIONS 1 & 2: 2
4. FEELING TIRED OR HAVING LITTLE ENERGY: 0
3. TROUBLE FALLING OR STAYING ASLEEP: 0
SUM OF ALL RESPONSES TO PHQ QUESTIONS 1-9: 2
7. TROUBLE CONCENTRATING ON THINGS, SUCH AS READING THE NEWSPAPER OR WATCHING TELEVISION: 0
8. MOVING OR SPEAKING SO SLOWLY THAT OTHER PEOPLE COULD HAVE NOTICED. OR THE OPPOSITE, BEING SO FIGETY OR RESTLESS THAT YOU HAVE BEEN MOVING AROUND A LOT MORE THAN USUAL: 0
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
## 2024-01-18 DIAGNOSIS — H69.93 DYSFUNCTION OF BOTH EUSTACHIAN TUBES: Primary | ICD-10-CM

## 2024-01-18 NOTE — TELEPHONE ENCOUNTER
Patient called into the office states she would like ea prescription refill for ipratropium nasal spray and flonase. Patient states at last office visit she mentioned to provider that she is running out of nasal spray provider advised to call in to the office for prescription refill flonase and other nasal spray.     Advised patient will correspond with provider.

## 2024-01-19 RX ORDER — FLUTICASONE PROPIONATE 50 MCG
2 SPRAY, SUSPENSION (ML) NASAL DAILY
Qty: 16 G | Refills: 3 | Status: SHIPPED | OUTPATIENT
Start: 2024-01-19

## 2024-01-19 RX ORDER — IPRATROPIUM BROMIDE 42 UG/1
2 SPRAY, METERED NASAL 4 TIMES DAILY
Qty: 15 ML | Refills: 3 | Status: SHIPPED | OUTPATIENT
Start: 2024-01-19

## 2024-01-25 DIAGNOSIS — Z85.3 PERSONAL HISTORY OF MALIGNANT NEOPLASM OF BREAST: Primary | ICD-10-CM

## 2024-02-03 DIAGNOSIS — J01.90 ACUTE BACTERIAL SINUSITIS: ICD-10-CM

## 2024-02-03 DIAGNOSIS — Z76.0 MEDICATION REFILL: ICD-10-CM

## 2024-02-03 DIAGNOSIS — B96.89 ACUTE BACTERIAL SINUSITIS: ICD-10-CM

## 2024-02-05 RX ORDER — ALBUTEROL SULFATE 90 UG/1
AEROSOL, METERED RESPIRATORY (INHALATION)
Qty: 18 G | Refills: 0 | Status: SHIPPED | OUTPATIENT
Start: 2024-02-05

## 2024-02-21 ENCOUNTER — HOSPITAL ENCOUNTER (OUTPATIENT)
Dept: INFUSION THERAPY | Age: 69
Discharge: HOME | End: 2024-02-21

## 2024-02-21 ENCOUNTER — PROCEDURE VISIT (OUTPATIENT)
Dept: AUDIOLOGY | Age: 69
End: 2024-02-21
Payer: MEDICARE

## 2024-02-21 ENCOUNTER — OFFICE VISIT (OUTPATIENT)
Dept: ENT CLINIC | Age: 69
End: 2024-02-21
Payer: MEDICARE

## 2024-02-21 ENCOUNTER — OFFICE VISIT (OUTPATIENT)
Dept: ONCOLOGY | Age: 69
End: 2024-02-21
Payer: MEDICARE

## 2024-02-21 VITALS
HEIGHT: 60 IN | BODY MASS INDEX: 27.88 KG/M2 | TEMPERATURE: 96.8 F | OXYGEN SATURATION: 98 % | WEIGHT: 142 LBS | HEART RATE: 94 BPM | SYSTOLIC BLOOD PRESSURE: 113 MMHG | DIASTOLIC BLOOD PRESSURE: 83 MMHG

## 2024-02-21 VITALS
SYSTOLIC BLOOD PRESSURE: 124 MMHG | RESPIRATION RATE: 18 BRPM | DIASTOLIC BLOOD PRESSURE: 85 MMHG | WEIGHT: 140 LBS | HEIGHT: 61 IN | BODY MASS INDEX: 26.43 KG/M2 | OXYGEN SATURATION: 95 % | HEART RATE: 88 BPM

## 2024-02-21 DIAGNOSIS — H90.6 MIXED CONDUCTIVE AND SENSORINEURAL HEARING LOSS OF BOTH EARS: ICD-10-CM

## 2024-02-21 DIAGNOSIS — Z85.3 PERSONAL HISTORY OF MALIGNANT NEOPLASM OF BREAST: Primary | ICD-10-CM

## 2024-02-21 DIAGNOSIS — H90.6 MIXED CONDUCTIVE AND SENSORINEURAL HEARING LOSS OF BOTH EARS: Primary | ICD-10-CM

## 2024-02-21 DIAGNOSIS — H69.93 DYSFUNCTION OF BOTH EUSTACHIAN TUBES: Primary | ICD-10-CM

## 2024-02-21 DIAGNOSIS — H69.93 DYSFUNCTION OF BOTH EUSTACHIAN TUBES: ICD-10-CM

## 2024-02-21 PROCEDURE — 1090F PRES/ABSN URINE INCON ASSESS: CPT

## 2024-02-21 PROCEDURE — G8484 FLU IMMUNIZE NO ADMIN: HCPCS | Performed by: INTERNAL MEDICINE

## 2024-02-21 PROCEDURE — G8427 DOCREV CUR MEDS BY ELIG CLIN: HCPCS

## 2024-02-21 PROCEDURE — 99213 OFFICE O/P EST LOW 20 MIN: CPT

## 2024-02-21 PROCEDURE — G8399 PT W/DXA RESULTS DOCUMENT: HCPCS

## 2024-02-21 PROCEDURE — 92567 TYMPANOMETRY: CPT | Performed by: AUDIOLOGIST

## 2024-02-21 PROCEDURE — G8419 CALC BMI OUT NRM PARAM NOF/U: HCPCS | Performed by: INTERNAL MEDICINE

## 2024-02-21 PROCEDURE — G8399 PT W/DXA RESULTS DOCUMENT: HCPCS | Performed by: INTERNAL MEDICINE

## 2024-02-21 PROCEDURE — 3079F DIAST BP 80-89 MM HG: CPT | Performed by: INTERNAL MEDICINE

## 2024-02-21 PROCEDURE — G8484 FLU IMMUNIZE NO ADMIN: HCPCS

## 2024-02-21 PROCEDURE — G8419 CALC BMI OUT NRM PARAM NOF/U: HCPCS

## 2024-02-21 PROCEDURE — 3017F COLORECTAL CA SCREEN DOC REV: CPT

## 2024-02-21 PROCEDURE — 99214 OFFICE O/P EST MOD 30 MIN: CPT

## 2024-02-21 PROCEDURE — 92557 COMPREHENSIVE HEARING TEST: CPT | Performed by: AUDIOLOGIST

## 2024-02-21 PROCEDURE — 3079F DIAST BP 80-89 MM HG: CPT

## 2024-02-21 PROCEDURE — G8427 DOCREV CUR MEDS BY ELIG CLIN: HCPCS | Performed by: INTERNAL MEDICINE

## 2024-02-21 PROCEDURE — 3074F SYST BP LT 130 MM HG: CPT

## 2024-02-21 PROCEDURE — 1123F ACP DISCUSS/DSCN MKR DOCD: CPT | Performed by: INTERNAL MEDICINE

## 2024-02-21 PROCEDURE — 3017F COLORECTAL CA SCREEN DOC REV: CPT | Performed by: INTERNAL MEDICINE

## 2024-02-21 PROCEDURE — 1123F ACP DISCUSS/DSCN MKR DOCD: CPT

## 2024-02-21 PROCEDURE — 99214 OFFICE O/P EST MOD 30 MIN: CPT | Performed by: INTERNAL MEDICINE

## 2024-02-21 PROCEDURE — 3074F SYST BP LT 130 MM HG: CPT | Performed by: INTERNAL MEDICINE

## 2024-02-21 PROCEDURE — 4004F PT TOBACCO SCREEN RCVD TLK: CPT

## 2024-02-21 PROCEDURE — 1090F PRES/ABSN URINE INCON ASSESS: CPT | Performed by: INTERNAL MEDICINE

## 2024-02-21 PROCEDURE — 4004F PT TOBACCO SCREEN RCVD TLK: CPT | Performed by: INTERNAL MEDICINE

## 2024-02-21 RX ORDER — TAMOXIFEN CITRATE 20 MG/1
20 TABLET ORAL DAILY
Qty: 90 TABLET | Refills: 1 | Status: SHIPPED | OUTPATIENT
Start: 2024-02-21 | End: 2025-02-15

## 2024-02-21 RX ORDER — AZELASTINE 1 MG/ML
1 SPRAY, METERED NASAL 2 TIMES DAILY
Qty: 30 ML | Refills: 1 | Status: SHIPPED
Start: 2024-02-21 | End: 2024-02-21

## 2024-02-21 ASSESSMENT — ENCOUNTER SYMPTOMS
SHORTNESS OF BREATH: 0
SINUS PRESSURE: 0
RHINORRHEA: 1
EYE DISCHARGE: 0
SORE THROAT: 0
EYE PAIN: 0
BACK PAIN: 0
DIARRHEA: 0
COUGH: 0
VOMITING: 0
ALLERGIC/IMMUNOLOGIC NEGATIVE: 1
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
## 2024-02-21 NOTE — PROGRESS NOTES
Patient refused printed AVS, pt states they have MYCHART. All questions answered.     
she underwent a left modified radical mastectomy with full left axillary node dissection; Right total mastectomy, with right sentinel lymph node (Right SLN # 1-4).     Surgical pathology report has been requested.     Adjuvant therapy:  Completed adjuvant chemotherapy likely AC-T  Declined adjuvant radiation  Adjuvant endocrine therapy: She was started on endocrine therapy approximately January 2019; according to her understanding she was started on tamoxifen 20 mg by mouth once daily; No AI due to underlying osteoporosis for which she was given Prolia injections.  She stopped tamoxifen in May 2023.  She developed lymphedema of the left upper extremity post mastectomy.  Uses compression sleeve and vest.    Pathology was reviewed at [facility]:    SUMMARYProcedure -mastectomySpecimen Laterality -leftTumor   Site, Invasive Carcinoma-8:00Histologic Type -invasive carcinoma, no   special type (ductal)Histologic Grade (Cherrie Histologic Score):                    Tubule differentiation- score-3                 Nuclear   pleomorphism- score 2                 Mitotic rate- score 2                  Overall Grade- grade 2 , (score 7 )Tumor Size: Size of Largest   Invasive Carcinoma-24 mmDuctal Carcinoma In Situ (DCIS)-present,   negative for extensive intraductal componentLymphovascular   Invasion-not identifiedTreatment Effect in the Breast-no known   presurgical therapyMargin status for invasive carcinoma: All margins   negative for invasive carcinomaDistance from invasive carcinoma to   closest margin-13 mm/anteriorMargin Status for DCIS (if   applicable)-all margins negative for DCISDistance from DCIS to closest   margin (if applicable)-4 mm/anteriorRegional Lymph Nodes: Tumor   present in regional lymph nodesTotal number of lymph nodes   examined-22Number of lymph nodes with macrometastases-13Number of   lymph nodes with micrometastases or isolated tumor cells-0Size of   largest metastatic deposit-12 mmExtranodal

## 2024-02-21 NOTE — PROGRESS NOTES
given today:  Oscar Blank MSN, FNP-BC  [facility] - Ear, Nose and Throat    The information contained in this note has been dictated using drug and medical speech recognition software and may contain errors

## 2024-03-04 DIAGNOSIS — Z76.0 MEDICATION REFILL: ICD-10-CM

## 2024-03-04 DIAGNOSIS — B96.89 ACUTE BACTERIAL SINUSITIS: ICD-10-CM

## 2024-03-04 DIAGNOSIS — J01.90 ACUTE BACTERIAL SINUSITIS: ICD-10-CM

## 2024-03-05 RX ORDER — ALBUTEROL SULFATE 90 UG/1
AEROSOL, METERED RESPIRATORY (INHALATION)
Qty: 18 G | Refills: 0 | Status: SHIPPED | OUTPATIENT
Start: 2024-03-05

## 2024-04-05 ENCOUNTER — OFFICE VISIT (OUTPATIENT)
Dept: FAMILY MEDICINE CLINIC | Age: 69
End: 2024-04-05
Payer: MEDICARE

## 2024-04-05 VITALS
SYSTOLIC BLOOD PRESSURE: 132 MMHG | HEIGHT: 61 IN | BODY MASS INDEX: 25.97 KG/M2 | RESPIRATION RATE: 16 BRPM | WEIGHT: 137.57 LBS | HEART RATE: 84 BPM | OXYGEN SATURATION: 99 % | TEMPERATURE: 97.1 F | DIASTOLIC BLOOD PRESSURE: 88 MMHG

## 2024-04-05 DIAGNOSIS — G57.93 NEUROPATHY INVOLVING BOTH LOWER EXTREMITIES: ICD-10-CM

## 2024-04-05 DIAGNOSIS — J44.9 CHRONIC OBSTRUCTIVE PULMONARY DISEASE, UNSPECIFIED COPD TYPE (HCC): ICD-10-CM

## 2024-04-05 DIAGNOSIS — Z00.00 MEDICARE ANNUAL WELLNESS VISIT, SUBSEQUENT: Primary | ICD-10-CM

## 2024-04-05 DIAGNOSIS — M85.80 OSTEOPENIA, UNSPECIFIED LOCATION: ICD-10-CM

## 2024-04-05 DIAGNOSIS — Z87.39 HISTORY OF OSTEOPOROSIS: ICD-10-CM

## 2024-04-05 DIAGNOSIS — H90.6 MIXED CONDUCTIVE AND SENSORINEURAL HEARING LOSS OF BOTH EARS: ICD-10-CM

## 2024-04-05 DIAGNOSIS — Z87.81 HISTORY OF VERTEBRAL FRACTURE: ICD-10-CM

## 2024-04-05 PROBLEM — M20.10 VALGUS DEFORMITY OF GREAT TOE: Status: ACTIVE | Noted: 2024-01-17

## 2024-04-05 PROBLEM — B35.1 ONYCHOMYCOSIS: Status: ACTIVE | Noted: 2024-01-17

## 2024-04-05 PROBLEM — L03.032 PARONYCHIA OF TOE OF LEFT FOOT: Status: ACTIVE | Noted: 2024-01-17

## 2024-04-05 PROBLEM — M79.675 PAIN OF TOE OF LEFT FOOT: Status: ACTIVE | Noted: 2024-01-17

## 2024-04-05 PROBLEM — M79.674 PAIN OF TOE OF RIGHT FOOT: Status: ACTIVE | Noted: 2024-01-17

## 2024-04-05 PROBLEM — L60.0 INGROWN TOENAIL: Status: ACTIVE | Noted: 2024-01-17

## 2024-04-05 PROBLEM — M65.872 OTHER SYNOVITIS AND TENOSYNOVITIS, LEFT ANKLE AND FOOT: Status: ACTIVE | Noted: 2024-01-17

## 2024-04-05 PROBLEM — M77.52: Status: ACTIVE | Noted: 2024-01-17

## 2024-04-05 PROBLEM — L03.031 PARONYCHIA OF TOE OF RIGHT FOOT: Status: ACTIVE | Noted: 2024-01-17

## 2024-04-05 LAB
ANION GAP SERPL CALCULATED.3IONS-SCNC: 10 MMOL/L (ref 7–16)
BUN BLDV-MCNC: 22 MG/DL (ref 6–23)
CALCIUM SERPL-MCNC: 9.5 MG/DL (ref 8.6–10.2)
CHLORIDE BLD-SCNC: 103 MMOL/L (ref 98–107)
CO2: 26 MMOL/L (ref 22–29)
CREAT SERPL-MCNC: 0.6 MG/DL (ref 0.5–1)
GFR SERPL CREATININE-BSD FRML MDRD: >90 ML/MIN/1.73M2
GLUCOSE BLD-MCNC: 91 MG/DL (ref 74–99)
POTASSIUM SERPL-SCNC: 4.4 MMOL/L (ref 3.5–5)
SODIUM BLD-SCNC: 139 MMOL/L (ref 132–146)

## 2024-04-05 PROCEDURE — 3075F SYST BP GE 130 - 139MM HG: CPT

## 2024-04-05 PROCEDURE — G0439 PPPS, SUBSEQ VISIT: HCPCS

## 2024-04-05 PROCEDURE — 3079F DIAST BP 80-89 MM HG: CPT

## 2024-04-05 PROCEDURE — 3017F COLORECTAL CA SCREEN DOC REV: CPT | Performed by: FAMILY MEDICINE

## 2024-04-05 PROCEDURE — 1123F ACP DISCUSS/DSCN MKR DOCD: CPT

## 2024-04-05 RX ORDER — ALBUTEROL SULFATE 90 UG/1
2 AEROSOL, METERED RESPIRATORY (INHALATION) EVERY 6 HOURS PRN
Qty: 18 G | Refills: 3 | Status: SHIPPED | OUTPATIENT
Start: 2024-04-05

## 2024-04-05 ASSESSMENT — PATIENT HEALTH QUESTIONNAIRE - PHQ9
9. THOUGHTS THAT YOU WOULD BE BETTER OFF DEAD, OR OF HURTING YOURSELF: NOT AT ALL
SUM OF ALL RESPONSES TO PHQ QUESTIONS 1-9: 3
3. TROUBLE FALLING OR STAYING ASLEEP: SEVERAL DAYS
6. FEELING BAD ABOUT YOURSELF - OR THAT YOU ARE A FAILURE OR HAVE LET YOURSELF OR YOUR FAMILY DOWN: NOT AT ALL
1. LITTLE INTEREST OR PLEASURE IN DOING THINGS: NOT AT ALL
10. IF YOU CHECKED OFF ANY PROBLEMS, HOW DIFFICULT HAVE THESE PROBLEMS MADE IT FOR YOU TO DO YOUR WORK, TAKE CARE OF THINGS AT HOME, OR GET ALONG WITH OTHER PEOPLE: NOT DIFFICULT AT ALL
2. FEELING DOWN, DEPRESSED OR HOPELESS: NOT AT ALL
SUM OF ALL RESPONSES TO PHQ QUESTIONS 1-9: 3
7. TROUBLE CONCENTRATING ON THINGS, SUCH AS READING THE NEWSPAPER OR WATCHING TELEVISION: NOT AT ALL
SUM OF ALL RESPONSES TO PHQ9 QUESTIONS 1 & 2: 0
4. FEELING TIRED OR HAVING LITTLE ENERGY: SEVERAL DAYS
5. POOR APPETITE OR OVEREATING: NOT AT ALL
8. MOVING OR SPEAKING SO SLOWLY THAT OTHER PEOPLE COULD HAVE NOTICED. OR THE OPPOSITE, BEING SO FIGETY OR RESTLESS THAT YOU HAVE BEEN MOVING AROUND A LOT MORE THAN USUAL: SEVERAL DAYS

## 2024-04-05 ASSESSMENT — LIFESTYLE VARIABLES
HOW MANY STANDARD DRINKS CONTAINING ALCOHOL DO YOU HAVE ON A TYPICAL DAY: PATIENT DOES NOT DRINK
HOW OFTEN DO YOU HAVE A DRINK CONTAINING ALCOHOL: NEVER

## 2024-04-05 NOTE — PATIENT INSTRUCTIONS
these benefits include a comprehensive review of your medical history including lifestyle, illnesses that may run in your family, and various assessments and screenings as appropriate.    After reviewing your medical record and screening and assessments performed today your provider may have ordered immunizations, labs, imaging, and/or referrals for you.  A list of these orders (if applicable) as well as your Preventive Care list are included within your After Visit Summary for your review.    Other Preventive Recommendations:    A preventive eye exam performed by an eye specialist is recommended every 1-2 years to screen for glaucoma; cataracts, macular degeneration, and other eye disorders.  A preventive dental visit is recommended every 6 months.  Try to get at least 150 minutes of exercise per week or 10,000 steps per day on a pedometer .  Order or download the FREE \"Exercise & Physical Activity: Your Everyday Guide\" from [employer]. Call 1-174.235.3002 or search [employer] online.  You need 9716-5139 mg of calcium and 6052-3875 IU of vitamin D per day. It is possible to meet your calcium requirement with diet alone, but a vitamin D supplement is usually necessary to meet this goal.  When exposed to the sun, use a sunscreen that protects against both UVA and UVB radiation with an SPF of 30 or greater. Reapply every 2 to 3 hours or after sweating, drying off with a towel, or swimming.  Always wear a seat belt when traveling in a car. Always wear a helmet when riding a bicycle or motorcycle.

## 2024-04-05 NOTE — PROGRESS NOTES
[facility]  Precepting [facility]    Subjective:  AWV  Hx of breast surgery and osteoporosis  COPD  ROS otherwise negative     Past medical, surgical, family and social history were reviewed, non-contributory, and unchanged unless otherwise stated.    Objective:    /83   Pulse 84   Temp 97.1 °F (36.2 °C) (Temporal)   Resp 16   Ht 1.549 m (5' 0.98\")   Wt 62.4 kg (137 lb 9.1 oz)   SpO2 99%   BMI 26.01 kg/m²     Exam is as noted by resident with the following changes, additions or corrections:    General:  NAD; alert & oriented x 3   Heart:  RRR, no murmurs, gallops, or rubs.  Lungs:  CTA bilaterally, no wheeze, rales or rhonchi  Abd: bowel sounds present, nontender, nondistended, no masses  Extrem:  No clubbing, cyanosis, or edema    Assessment/Plan:  AWV  Hx of breast cancer: on tamoxifen  Osteoporosis: Prolia  Numbness and tingling legs: PMR     Attending Physician Statement  I have reviewed the chart, including any radiology or labs. I have discussed the case, including pertinent history and exam findings with the resident.  I agree with the assessment, plan and orders as documented by the resident.  Please refer to the resident note for additional information.      Electronically signed by AILYN LACY MD on 4/5/2024 at 11:54 AM
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
## 2024-04-09 ENCOUNTER — OFFICE VISIT (OUTPATIENT)
Dept: ENT CLINIC | Age: 69
End: 2024-04-09
Payer: MEDICARE

## 2024-04-09 VITALS
SYSTOLIC BLOOD PRESSURE: 119 MMHG | HEIGHT: 61 IN | OXYGEN SATURATION: 96 % | HEART RATE: 91 BPM | TEMPERATURE: 98.1 F | WEIGHT: 135 LBS | BODY MASS INDEX: 25.49 KG/M2 | DIASTOLIC BLOOD PRESSURE: 75 MMHG

## 2024-04-09 DIAGNOSIS — J30.9 CHRONIC ALLERGIC RHINITIS: ICD-10-CM

## 2024-04-09 DIAGNOSIS — H69.93 DYSFUNCTION OF BOTH EUSTACHIAN TUBES: Primary | ICD-10-CM

## 2024-04-09 PROCEDURE — 3017F COLORECTAL CA SCREEN DOC REV: CPT

## 2024-04-09 PROCEDURE — 4004F PT TOBACCO SCREEN RCVD TLK: CPT

## 2024-04-09 PROCEDURE — 1090F PRES/ABSN URINE INCON ASSESS: CPT

## 2024-04-09 PROCEDURE — G8399 PT W/DXA RESULTS DOCUMENT: HCPCS

## 2024-04-09 PROCEDURE — 3074F SYST BP LT 130 MM HG: CPT

## 2024-04-09 PROCEDURE — G8427 DOCREV CUR MEDS BY ELIG CLIN: HCPCS

## 2024-04-09 PROCEDURE — 3078F DIAST BP <80 MM HG: CPT

## 2024-04-09 PROCEDURE — 1123F ACP DISCUSS/DSCN MKR DOCD: CPT

## 2024-04-09 PROCEDURE — 99213 OFFICE O/P EST LOW 20 MIN: CPT

## 2024-04-09 PROCEDURE — G8419 CALC BMI OUT NRM PARAM NOF/U: HCPCS

## 2024-04-09 RX ORDER — AZELASTINE 1 MG/ML
1 SPRAY, METERED NASAL 2 TIMES DAILY
Qty: 30 ML | Refills: 1 | Status: SHIPPED | OUTPATIENT
Start: 2024-04-09

## 2024-04-09 RX ORDER — FLUTICASONE PROPIONATE 50 MCG
2 SPRAY, SUSPENSION (ML) NASAL DAILY
Qty: 48 G | Refills: 1 | Status: SHIPPED | OUTPATIENT
Start: 2024-04-09

## 2024-04-09 ASSESSMENT — ENCOUNTER SYMPTOMS
SINUS PRESSURE: 0
VOMITING: 0
ALLERGIC/IMMUNOLOGIC NEGATIVE: 1
RHINORRHEA: 0
BACK PAIN: 0
EYE PAIN: 0
EYE DISCHARGE: 0
DIARRHEA: 0
SHORTNESS OF BREATH: 0
COUGH: 0
SORE THROAT: 0

## 2024-04-09 NOTE — PROGRESS NOTES
Subjective:      Patient ID:  Doreen Nunez is a 68 y.o. female.    HPI:  Pt returns for recheck of allergies, ETD and hearing loss.       History of   no surgery    Nasal Steroid: yes   Name: fluticasone (Flonase)   Still taking: no   reason for stopping: Has not been taking for 2 weeks.    Other therapy:   Astelin- yes - not having symptoms stopped.   oral antihistamine- none  leukotriene inhibitor- none  oral decongestant- none    which has been  effective     Pt has been off therapy for 2 week(s) and is doing well    Patient states she has been doing very well and did stop nasal sprays 2 weeks ago due to impovement    The patient is complaining of none    The patients worst time of year is spring    Patient fitted for bilateral hearing aids and is doing well.     Patient's medications, allergies, past medical, surgical, social and family histories were reviewed and updated as appropriate.        Review of Systems   Constitutional:  Negative for chills and fever.   HENT:  Positive for hearing loss (Bilateral hearing aids.). Negative for congestion, ear discharge, ear pain, postnasal drip, rhinorrhea, sinus pressure, sneezing, sore throat and tinnitus.    Eyes:  Negative for pain and discharge.   Respiratory:  Negative for cough and shortness of breath.    Cardiovascular:  Negative for chest pain.   Gastrointestinal:  Negative for diarrhea and vomiting.   Genitourinary:  Negative for flank pain.   Musculoskeletal:  Negative for back pain and neck pain.   Skin:  Negative for rash.   Allergic/Immunologic: Negative.    Neurological:  Negative for syncope and headaches.   All other systems reviewed and are negative.      Objective:     Vitals:    04/09/24 1526   BP: 119/75   Pulse: 91   Temp: 98.1 °F (36.7 °C)   SpO2: 96%     Physical Exam  Vitals reviewed.   Constitutional:       Appearance: Normal appearance.   HENT:      Head: Normocephalic and atraumatic.      Jaw: There is normal jaw occlusion. No tenderness.

## 2024-04-25 ENCOUNTER — HOSPITAL ENCOUNTER (OUTPATIENT)
Dept: INFUSION THERAPY | Age: 69
Setting detail: INFUSION SERIES
Discharge: HOME | End: 2024-04-25
Payer: MEDICARE

## 2024-04-25 VITALS
RESPIRATION RATE: 16 BRPM | WEIGHT: 138 LBS | HEART RATE: 96 BPM | OXYGEN SATURATION: 96 % | TEMPERATURE: 97.5 F | DIASTOLIC BLOOD PRESSURE: 81 MMHG | BODY MASS INDEX: 26.06 KG/M2 | SYSTOLIC BLOOD PRESSURE: 137 MMHG | HEIGHT: 61 IN

## 2024-04-25 DIAGNOSIS — Z87.39 HISTORY OF OSTEOPOROSIS: Primary | ICD-10-CM

## 2024-04-25 DIAGNOSIS — M85.80 OSTEOPENIA, UNSPECIFIED LOCATION: ICD-10-CM

## 2024-04-25 PROCEDURE — 96372 THER/PROPH/DIAG INJ SC/IM: CPT

## 2024-04-25 PROCEDURE — 6360000002 HC RX W HCPCS

## 2024-04-25 RX ADMIN — DENOSUMAB 60 MG: 60 INJECTION SUBCUTANEOUS at 11:34

## 2024-05-06 ENCOUNTER — OFFICE VISIT (OUTPATIENT)
Dept: FAMILY MEDICINE CLINIC | Age: 69
End: 2024-05-06
Payer: MEDICARE

## 2024-05-06 VITALS
DIASTOLIC BLOOD PRESSURE: 72 MMHG | HEIGHT: 61 IN | TEMPERATURE: 98.5 F | HEART RATE: 91 BPM | OXYGEN SATURATION: 98 % | WEIGHT: 136 LBS | SYSTOLIC BLOOD PRESSURE: 128 MMHG | BODY MASS INDEX: 25.68 KG/M2

## 2024-05-06 DIAGNOSIS — M79.631 PAIN OF RIGHT FOREARM: ICD-10-CM

## 2024-05-06 DIAGNOSIS — M79.641 RIGHT HAND PAIN: Primary | ICD-10-CM

## 2024-05-06 PROCEDURE — 99214 OFFICE O/P EST MOD 30 MIN: CPT | Performed by: PHYSICIAN ASSISTANT

## 2024-05-06 PROCEDURE — 3074F SYST BP LT 130 MM HG: CPT | Performed by: PHYSICIAN ASSISTANT

## 2024-05-06 PROCEDURE — 3017F COLORECTAL CA SCREEN DOC REV: CPT | Performed by: PHYSICIAN ASSISTANT

## 2024-05-06 PROCEDURE — 1090F PRES/ABSN URINE INCON ASSESS: CPT | Performed by: PHYSICIAN ASSISTANT

## 2024-05-06 PROCEDURE — G8419 CALC BMI OUT NRM PARAM NOF/U: HCPCS | Performed by: PHYSICIAN ASSISTANT

## 2024-05-06 PROCEDURE — 96372 THER/PROPH/DIAG INJ SC/IM: CPT | Performed by: PHYSICIAN ASSISTANT

## 2024-05-06 PROCEDURE — 3078F DIAST BP <80 MM HG: CPT | Performed by: PHYSICIAN ASSISTANT

## 2024-05-06 PROCEDURE — 1123F ACP DISCUSS/DSCN MKR DOCD: CPT | Performed by: PHYSICIAN ASSISTANT

## 2024-05-06 PROCEDURE — 4004F PT TOBACCO SCREEN RCVD TLK: CPT | Performed by: PHYSICIAN ASSISTANT

## 2024-05-06 PROCEDURE — G8399 PT W/DXA RESULTS DOCUMENT: HCPCS | Performed by: PHYSICIAN ASSISTANT

## 2024-05-06 PROCEDURE — G8427 DOCREV CUR MEDS BY ELIG CLIN: HCPCS | Performed by: PHYSICIAN ASSISTANT

## 2024-05-06 RX ORDER — METHYLPREDNISOLONE ACETATE 80 MG/ML
80 INJECTION, SUSPENSION INTRA-ARTICULAR; INTRALESIONAL; INTRAMUSCULAR; SOFT TISSUE ONCE
Status: COMPLETED | OUTPATIENT
Start: 2024-05-06 | End: 2024-05-06

## 2024-05-06 RX ORDER — PREDNISONE 10 MG/1
TABLET ORAL
Qty: 18 TABLET | Refills: 0 | Status: SHIPPED | OUTPATIENT
Start: 2024-05-06

## 2024-05-06 RX ADMIN — METHYLPREDNISOLONE ACETATE 80 MG: 80 INJECTION, SUSPENSION INTRA-ARTICULAR; INTRALESIONAL; INTRAMUSCULAR; SOFT TISSUE at 12:18

## 2024-05-06 NOTE — PROGRESS NOTES
24  Doreen Nunez : 1955 Sex: female  Age 68 y.o.      Subjective:  Chief Complaint   Patient presents with    Hand Pain     Hand, wrist and goes up the arm.  No known injury but she was helping family move last week.  Has doron in it from 2016 or 2017.          HPI:   HPI  Doreen Nunez , 68 y.o. female presents to [facility] care for evaluation of right hand pain, right forearm pain.  The patient has had the symptoms ongoing for the last several days.  The patient states that she recently helped family move last week.  The patient states that she has had previous fracture and surgery and with a doron placed in the ulnar aspect of the forearm back in  or 17.  The patient feels that at times she wonders if the hardware is moving causing some of the pain discomfort because it does seem to cause quite a bit of pain and discomfort.  The patient has not any chest pain, shortness of breath, fevers.  No definite falls or traumatic injury at this time.  The patient has not had a redness.        ROS:   Unless otherwise stated in this report the patient's positive and negative responses for review of systems for constitutional, eyes, ENT, cardiovascular, respiratory, gastrointestinal, neurological, , musculoskeletal, and integument systems and related systems to the presenting problem are either stated in the history of present illness or were not pertinent or were negative for the symptoms and/or complaints related to the presenting medical problem.  Positives and pertinent negatives as per HPI.  All others reviewed and are negative.      PMH:     Past Medical History:   Diagnosis Date    Arthritis     Bipolar disorder ([facility])     Breast cancer ([facility]) 2019    bilateral    Cancer of female genital organ ([facility])     h/o vaginal cancer [id number]    Chronic back pain     Chronic neck pain     Dr Jean     COPD (chronic obstructive pulmonary disease) ([facility])     Depression     Emphysema     Eustachian tube

## 2024-05-15 ENCOUNTER — HOSPITAL ENCOUNTER (OUTPATIENT)
Dept: INFUSION THERAPY | Age: 69
End: 2024-05-15

## 2024-05-21 ENCOUNTER — TELEPHONE (OUTPATIENT)
Dept: ENT CLINIC | Age: 69
End: 2024-05-21
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
## 2024-06-05 ENCOUNTER — OFFICE VISIT (OUTPATIENT)
Dept: PHYSICAL MEDICINE AND REHAB | Age: 69
End: 2024-06-05
Payer: MEDICARE

## 2024-06-05 ENCOUNTER — OFFICE VISIT (OUTPATIENT)
Dept: FAMILY MEDICINE CLINIC | Age: 69
End: 2024-06-05
Payer: MEDICARE

## 2024-06-05 VITALS
WEIGHT: 143 LBS | OXYGEN SATURATION: 94 % | BODY MASS INDEX: 27 KG/M2 | DIASTOLIC BLOOD PRESSURE: 82 MMHG | HEIGHT: 61 IN | HEART RATE: 88 BPM | SYSTOLIC BLOOD PRESSURE: 130 MMHG

## 2024-06-05 VITALS
SYSTOLIC BLOOD PRESSURE: 128 MMHG | DIASTOLIC BLOOD PRESSURE: 82 MMHG | TEMPERATURE: 97.7 F | HEIGHT: 61 IN | HEART RATE: 78 BPM | OXYGEN SATURATION: 98 % | BODY MASS INDEX: 26.62 KG/M2 | WEIGHT: 141 LBS

## 2024-06-05 DIAGNOSIS — M54.16 LUMBAR RADICULITIS: Primary | ICD-10-CM

## 2024-06-05 DIAGNOSIS — J01.90 ACUTE BACTERIAL SINUSITIS: Primary | ICD-10-CM

## 2024-06-05 DIAGNOSIS — R20.2 PARESTHESIA OF BOTH FEET: ICD-10-CM

## 2024-06-05 DIAGNOSIS — H92.01 RIGHT EAR PAIN: ICD-10-CM

## 2024-06-05 DIAGNOSIS — M25.551 GREATER TROCHANTERIC PAIN SYNDROME OF BOTH LOWER EXTREMITIES: ICD-10-CM

## 2024-06-05 DIAGNOSIS — B96.89 ACUTE BACTERIAL SINUSITIS: Primary | ICD-10-CM

## 2024-06-05 DIAGNOSIS — M25.552 GREATER TROCHANTERIC PAIN SYNDROME OF BOTH LOWER EXTREMITIES: ICD-10-CM

## 2024-06-05 PROCEDURE — G8399 PT W/DXA RESULTS DOCUMENT: HCPCS | Performed by: FAMILY MEDICINE

## 2024-06-05 PROCEDURE — G8419 CALC BMI OUT NRM PARAM NOF/U: HCPCS | Performed by: FAMILY MEDICINE

## 2024-06-05 PROCEDURE — 99204 OFFICE O/P NEW MOD 45 MIN: CPT | Performed by: STUDENT IN AN ORGANIZED HEALTH CARE EDUCATION/TRAINING PROGRAM

## 2024-06-05 PROCEDURE — 3075F SYST BP GE 130 - 139MM HG: CPT | Performed by: STUDENT IN AN ORGANIZED HEALTH CARE EDUCATION/TRAINING PROGRAM

## 2024-06-05 PROCEDURE — 3079F DIAST BP 80-89 MM HG: CPT | Performed by: FAMILY MEDICINE

## 2024-06-05 PROCEDURE — 4004F PT TOBACCO SCREEN RCVD TLK: CPT | Performed by: STUDENT IN AN ORGANIZED HEALTH CARE EDUCATION/TRAINING PROGRAM

## 2024-06-05 PROCEDURE — 1090F PRES/ABSN URINE INCON ASSESS: CPT | Performed by: FAMILY MEDICINE

## 2024-06-05 PROCEDURE — 4004F PT TOBACCO SCREEN RCVD TLK: CPT | Performed by: FAMILY MEDICINE

## 2024-06-05 PROCEDURE — 1123F ACP DISCUSS/DSCN MKR DOCD: CPT | Performed by: STUDENT IN AN ORGANIZED HEALTH CARE EDUCATION/TRAINING PROGRAM

## 2024-06-05 PROCEDURE — 99213 OFFICE O/P EST LOW 20 MIN: CPT | Performed by: FAMILY MEDICINE

## 2024-06-05 PROCEDURE — 3074F SYST BP LT 130 MM HG: CPT | Performed by: FAMILY MEDICINE

## 2024-06-05 PROCEDURE — 1090F PRES/ABSN URINE INCON ASSESS: CPT | Performed by: STUDENT IN AN ORGANIZED HEALTH CARE EDUCATION/TRAINING PROGRAM

## 2024-06-05 PROCEDURE — 3079F DIAST BP 80-89 MM HG: CPT | Performed by: STUDENT IN AN ORGANIZED HEALTH CARE EDUCATION/TRAINING PROGRAM

## 2024-06-05 PROCEDURE — 1123F ACP DISCUSS/DSCN MKR DOCD: CPT | Performed by: FAMILY MEDICINE

## 2024-06-05 PROCEDURE — 3017F COLORECTAL CA SCREEN DOC REV: CPT | Performed by: FAMILY MEDICINE

## 2024-06-05 PROCEDURE — G8399 PT W/DXA RESULTS DOCUMENT: HCPCS | Performed by: STUDENT IN AN ORGANIZED HEALTH CARE EDUCATION/TRAINING PROGRAM

## 2024-06-05 PROCEDURE — G8419 CALC BMI OUT NRM PARAM NOF/U: HCPCS | Performed by: STUDENT IN AN ORGANIZED HEALTH CARE EDUCATION/TRAINING PROGRAM

## 2024-06-05 PROCEDURE — G8427 DOCREV CUR MEDS BY ELIG CLIN: HCPCS | Performed by: STUDENT IN AN ORGANIZED HEALTH CARE EDUCATION/TRAINING PROGRAM

## 2024-06-05 PROCEDURE — 3017F COLORECTAL CA SCREEN DOC REV: CPT | Performed by: STUDENT IN AN ORGANIZED HEALTH CARE EDUCATION/TRAINING PROGRAM

## 2024-06-05 PROCEDURE — G8427 DOCREV CUR MEDS BY ELIG CLIN: HCPCS | Performed by: FAMILY MEDICINE

## 2024-06-05 RX ORDER — AMOXICILLIN 875 MG/1
875 TABLET, COATED ORAL 2 TIMES DAILY
Qty: 14 TABLET | Refills: 0 | Status: SHIPPED | OUTPATIENT
Start: 2024-06-05 | End: 2024-06-12

## 2024-06-05 RX ORDER — CIPROFLOXACIN AND DEXAMETHASONE 3; 1 MG/ML; MG/ML
4 SUSPENSION/ DROPS AURICULAR (OTIC) 2 TIMES DAILY
Qty: 7.5 ML | Refills: 0 | Status: SHIPPED | OUTPATIENT
Start: 2024-06-05

## 2024-06-05 RX ORDER — METHYLPREDNISOLONE 4 MG/1
TABLET ORAL
Qty: 1 KIT | Refills: 0 | Status: SHIPPED | OUTPATIENT
Start: 2024-06-05

## 2024-06-05 RX ORDER — MELOXICAM 7.5 MG/1
7.5 TABLET ORAL DAILY
COMMUNITY
Start: 2024-05-31

## 2024-06-05 ASSESSMENT — ENCOUNTER SYMPTOMS
SINUS PRESSURE: 1
TROUBLE SWALLOWING: 0
GASTROINTESTINAL NEGATIVE: 1
SORE THROAT: 0
RESPIRATORY NEGATIVE: 1
EYES NEGATIVE: 1

## 2024-06-05 NOTE — PATIENT INSTRUCTIONS
- EMG (nerve test) of the legs to be added to the nerve test of the arm at [facility]  - Ice to the hip for 20 minutes at a time  - Voltaren gel prescribed to rub on the hip up to 3 times per day  - We will follow up after the EMG/nerve test

## 2024-06-05 NOTE — PROGRESS NOTES
Medications:     diclofenac sodium (VOLTAREN) 1 % GEL, Apply 4 g topically 3 times daily as needed for Pain (right hip pain), Disp: 150 g, Rfl: 1    meloxicam (MOBIC) 7.5 MG tablet, Take 1 tablet by mouth daily, Disp: , Rfl:     ciprofloxacin-dexAMETHasone (CIPRODEX) 0.3-0.1 % otic suspension, Place 4 drops into the right ear 2 times daily, Disp: 7.5 mL, Rfl: 0    amoxicillin (AMOXIL) 875 MG tablet, Take 1 tablet by mouth 2 times daily for 7 days, Disp: 14 tablet, Rfl: 0    methylPREDNISolone (MEDROL DOSEPACK) 4 MG tablet, Take by mouth., Disp: 1 kit, Rfl: 0    predniSONE (DELTASONE) 10 MG tablet, 3 tabs once daily for 3 days, 2 tabs once daily for 3 days, 1 tab once daily for 3 days, Disp: 18 tablet, Rfl: 0    magnesium 200 MG TABS tablet, Take 5 tablets by mouth, Disp: , Rfl:     glycerin-hypromellose- 0.2-0.2-1 % SOLN opthalmic solution, , Disp: , Rfl:     [facility] ([facility] SU[facility] PO), , Disp: , Rfl:     pantoprazole (PROTONIX) 40 MG tablet, Take 1 tablet by mouth daily, Disp: , Rfl:     azelastine (ASTELIN) 0.1 % nasal spray, 1 spray by Nasal route 2 times daily Use in each nostril as directed, Disp: 30 mL, Rfl: 1    fluticasone (FLONASE) 50 MCG/ACT nasal spray, 2 sprays by Each Nostril route daily, Disp: 48 g, Rfl: 1    albuterol sulfate HFA (PROVENTIL;VENTOLIN;PROAIR) 108 (90 Base) MCG/ACT inhaler, Inhale 2 puffs into the lungs every 6 hours as needed for Wheezing, Disp: 18 g, Rfl: 3    letrozole (FEMARA) 2.5 MG tablet, Take 1 tablet by mouth daily, Disp: , Rfl:     tamoxifen (NOLVADEX) 20 MG tablet, Take 1 tablet by mouth daily, Disp: 90 tablet, Rfl: 1    ipratropium (ATROVENT) 0.06 % nasal spray, 2 sprays by Each Nostril route 4 times daily, Disp: 15 mL, Rfl: 3    rosuvastatin (CRESTOR) 20 MG tablet, Take 1 tablet by mouth nightly, Disp: 90 tablet, Rfl: 0    famotidine (PEPCID) 20 MG tablet, Take 1 tablet by mouth as needed (as needed for reflux), Disp: 90 tablet, Rfl: 0

## 2024-06-05 NOTE — PROGRESS NOTES
Blanquita Cade MD  Physical Medicine & Rehabilitation  [address] [address]  [phone]       PCP: Lesli Cheung DO  Date of visit: 6/14/24    Chief Complaint   Patient presents with    nerve pain     C/o pain in the \"skin level\". Pt states she has pain in the right leg, in the back of her right leg.   C/o numbness/tingling in the right hand two fingers.   C/o tremors in the left hand for years.  Ganglion cyst in the left wrist.  (EMG is scheduled).  C/o pain for a few years.  - 3/10 current pain level.         Dear Dr. Cheung,     Thank you for referring your patient to be seen.    As you know,  Doeren Nunez is a 68 y.o. female with past medical history as below who presents with feet burning, right posterior thigh for over 5 year(s). There was a gradual onset of this pain after no known injury. Now, the pain is constant and occurs daily.  She states that the pain is on the posterolateral right leg and radiates to the thigh, not past the knee.    She did undergo bilateral troch bursa injections with Dr. Thompson at [facility] about 2 months ago and got 100% relief on the left side. States she got \"temporary\" relief on the right side - states she got 100% relief for a few weeks on the left.    Also has known L4 and L5 compression fractures that were noted to be acute on the MRI from December - managed non-operatively with Dr. Thompson    She states that she is very active and exercises with arm exercises, leg exercises, push ups against a wall.    States that occasional her legs \"act up\" and will sometimes give out. Pain is worse with walking. Cannot lay on the right side at night.    Reports intermittent N/T in bilateral feet - usually toward the front of the feet but sometimes the whole foot. Unsure if anything precedes this.     Denies any falls. Denies bowel/bladder changes. Denies saddle anesthesia. Denies issues with balance.    The prior workup has included: MRI lumbar

## 2024-06-11 ENCOUNTER — HOSPITAL ENCOUNTER (OUTPATIENT)
Dept: INFUSION THERAPY | Age: 69
Discharge: HOME | End: 2024-06-11
Payer: MEDICARE

## 2024-06-11 DIAGNOSIS — Z85.3 PERSONAL HISTORY OF MALIGNANT NEOPLASM OF BREAST: ICD-10-CM

## 2024-06-11 LAB
ALBUMIN SERPL-MCNC: 4.3 G/DL (ref 3.5–5.2)
ALP SERPL-CCNC: 83 U/L (ref 35–104)
ALT SERPL-CCNC: 15 U/L (ref 0–32)
ANION GAP SERPL CALCULATED.3IONS-SCNC: 12 MMOL/L (ref 7–16)
AST SERPL-CCNC: 16 U/L (ref 0–31)
BASOPHILS # BLD: 0.03 K/UL (ref 0–0.2)
BASOPHILS NFR BLD: 0 % (ref 0–2)
BILIRUB SERPL-MCNC: 0.4 MG/DL (ref 0–1.2)
BUN SERPL-MCNC: 27 MG/DL (ref 6–23)
CALCIUM SERPL-MCNC: 9.4 MG/DL (ref 8.6–10.2)
CHLORIDE SERPL-SCNC: 105 MMOL/L (ref 98–107)
CO2 SERPL-SCNC: 25 MMOL/L (ref 22–29)
CREAT SERPL-MCNC: 0.8 MG/DL (ref 0.5–1)
EOSINOPHIL # BLD: 0 K/UL (ref 0.05–0.5)
EOSINOPHILS RELATIVE PERCENT: 0 % (ref 0–6)
ERYTHROCYTE [DISTWIDTH] IN BLOOD BY AUTOMATED COUNT: 16.8 % (ref 11.5–15)
GFR, ESTIMATED: 83 ML/MIN/1.73M2
GLUCOSE SERPL-MCNC: 103 MG/DL (ref 74–99)
HCT VFR BLD AUTO: 44.5 % (ref 34–48)
HGB BLD-MCNC: 14.2 G/DL (ref 11.5–15.5)
IMM GRANULOCYTES # BLD AUTO: 0.06 K/UL (ref 0–0.58)
IMM GRANULOCYTES NFR BLD: 1 % (ref 0–5)
LYMPHOCYTES NFR BLD: 1.19 K/UL (ref 1.5–4)
LYMPHOCYTES RELATIVE PERCENT: 11 % (ref 20–42)
MCH RBC QN AUTO: 28.5 PG (ref 26–35)
MCHC RBC AUTO-ENTMCNC: 31.9 G/DL (ref 32–34.5)
MCV RBC AUTO: 89.4 FL (ref 80–99.9)
MONOCYTES NFR BLD: 0.75 K/UL (ref 0.1–0.95)
MONOCYTES NFR BLD: 7 % (ref 2–12)
NEUTROPHILS NFR BLD: 82 % (ref 43–80)
NEUTS SEG NFR BLD: 9.04 K/UL (ref 1.8–7.3)
PLATELET # BLD AUTO: 263 K/UL (ref 130–450)
PMV BLD AUTO: 10.6 FL (ref 7–12)
POTASSIUM SERPL-SCNC: 4.2 MMOL/L (ref 3.5–5)
PROT SERPL-MCNC: 7.1 G/DL (ref 6.4–8.3)
RBC # BLD AUTO: 4.98 M/UL (ref 3.5–5.5)
SODIUM SERPL-SCNC: 142 MMOL/L (ref 132–146)
WBC OTHER # BLD: 11.1 K/UL (ref 4.5–11.5)

## 2024-06-11 PROCEDURE — 36415 COLL VENOUS BLD VENIPUNCTURE: CPT

## 2024-06-11 PROCEDURE — 80053 COMPREHEN METABOLIC PANEL: CPT

## 2024-06-11 PROCEDURE — 85025 COMPLETE CBC W/AUTO DIFF WBC: CPT

## 2024-06-12 ENCOUNTER — OFFICE VISIT (OUTPATIENT)
Dept: ONCOLOGY | Age: 69
End: 2024-06-12
Payer: MEDICARE

## 2024-06-12 ENCOUNTER — HOSPITAL ENCOUNTER (OUTPATIENT)
Dept: INFUSION THERAPY | Age: 69
Discharge: HOME | End: 2024-06-12

## 2024-06-12 VITALS
HEIGHT: 60 IN | DIASTOLIC BLOOD PRESSURE: 93 MMHG | HEART RATE: 92 BPM | TEMPERATURE: 96.9 F | OXYGEN SATURATION: 98 % | BODY MASS INDEX: 27.61 KG/M2 | WEIGHT: 140.6 LBS | SYSTOLIC BLOOD PRESSURE: 134 MMHG

## 2024-06-12 DIAGNOSIS — Z85.3 PERSONAL HISTORY OF MALIGNANT NEOPLASM OF BREAST: Primary | ICD-10-CM

## 2024-06-12 PROCEDURE — 3080F DIAST BP >= 90 MM HG: CPT | Performed by: NURSE PRACTITIONER

## 2024-06-12 PROCEDURE — 3075F SYST BP GE 130 - 139MM HG: CPT | Performed by: NURSE PRACTITIONER

## 2024-06-12 PROCEDURE — 1123F ACP DISCUSS/DSCN MKR DOCD: CPT | Performed by: NURSE PRACTITIONER

## 2024-06-12 PROCEDURE — 99213 OFFICE O/P EST LOW 20 MIN: CPT | Performed by: NURSE PRACTITIONER

## 2024-06-12 PROCEDURE — 99212 OFFICE O/P EST SF 10 MIN: CPT

## 2024-06-12 NOTE — PROGRESS NOTES
[facility] PHYSICIANS [facility] CARE [facility] MEDICAL ONCOLOGY  [address] [id number]  Dept: [phone]  Loc: [phone]  Attending Progress Note      Reason for Visit:   Left breast invasive carcinoma, and right breast DCIS.    Referring Physician:  eBrnadette Cruz, APRN - CNP    PCP:  Lesli Cheung DO    History of Present Illness:    Doreen Nunze is a 68 y.o. year old woman with a past medical history of essential hypertension, GERD, history of suicide attempt in her 20s, bipolar disorder, hyperlipidemia, COPD with tobacco use disorder, chronic back pain, and history of bilateral breast cancer.  She also reports a history of \"vaginal\" cancer in .  Review of records indicate a Pap smear on 09/15/2015 revealed low-grade squamous intraepithelial lesion/mild dysplasia with HPV cytopathic effect.     She presents today to establish care for her history of bilateral breast cancer which was diagnosed while living in [place] in 2019 post bilateral mastectomies.  She reports she underwent adjuvant chemotherapy for approximately 6 months.  Was recommended radiation therapy in the postmastectomy setting to the left breast but she declined.  She was on endocrine therapy from  to May 2023 at which time she decided to stop it as she reports she was advised it could interfere with her hormones/premarin topical.     Risks for breast cancer include age, gender, sister with breast cancer at age 50.  Sister with history of melanoma and metastatic disease to the brain at age 59, and a nephew with gastric cancer/GIST at age 54.  Menarche age 12-13.  Menopause age 50.  .  First live birth age 18.  She did not breast-feed.     The left breast mass was located at 6 o'clock position 4 cm from the nipple with a secondary left breast mass 4:00, 8 cm from the nipple.  Right breast mass was located at the 10 o'clock position 4 cm from the nipple.     2019 she

## 2024-06-22 ENCOUNTER — APPOINTMENT (OUTPATIENT)
Dept: GENERAL RADIOLOGY | Age: 69
End: 2024-06-22
Payer: MEDICARE

## 2024-06-22 ENCOUNTER — APPOINTMENT (OUTPATIENT)
Dept: CT IMAGING | Age: 69
End: 2024-06-22
Payer: MEDICARE

## 2024-06-22 ENCOUNTER — HOSPITAL ENCOUNTER (EMERGENCY)
Age: 69
Discharge: HOME | End: 2024-06-22
Attending: EMERGENCY MEDICINE
Payer: MEDICARE

## 2024-06-22 VITALS
RESPIRATION RATE: 20 BRPM | DIASTOLIC BLOOD PRESSURE: 80 MMHG | OXYGEN SATURATION: 97 % | HEART RATE: 99 BPM | SYSTOLIC BLOOD PRESSURE: 147 MMHG | TEMPERATURE: 98.5 F

## 2024-06-22 DIAGNOSIS — S09.90XA CLOSED HEAD INJURY, INITIAL ENCOUNTER: ICD-10-CM

## 2024-06-22 DIAGNOSIS — M54.2 NECK PAIN: ICD-10-CM

## 2024-06-22 DIAGNOSIS — S00.83XA CONTUSION OF FOREHEAD, INITIAL ENCOUNTER: ICD-10-CM
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
## 2024-06-22 DIAGNOSIS — S06.0X1A CONCUSSION WITH LOSS OF CONSCIOUSNESS OF 30 MINUTES OR LESS, INITIAL ENCOUNTER: ICD-10-CM

## 2024-06-22 LAB
ALBUMIN SERPL-MCNC: 4.2 G/DL (ref 3.5–5.2)
ALP SERPL-CCNC: 76 U/L (ref 35–104)
ALT SERPL-CCNC: 17 U/L (ref 0–32)
AMPHET UR QL SCN: NEGATIVE
ANION GAP SERPL CALCULATED.3IONS-SCNC: 11 MMOL/L (ref 7–16)
APAP SERPL-MCNC: <5 UG/ML (ref 10–30)
AST SERPL-CCNC: 19 U/L (ref 0–31)
BARBITURATES UR QL SCN: NEGATIVE
BASOPHILS # BLD: 0.04 K/UL (ref 0–0.2)
BASOPHILS NFR BLD: 1 % (ref 0–2)
BENZODIAZ UR QL: NEGATIVE
BILIRUB SERPL-MCNC: 0.5 MG/DL (ref 0–1.2)
BILIRUB UR QL STRIP: NEGATIVE
BUN SERPL-MCNC: 12 MG/DL (ref 6–23)
BUPRENORPHINE UR QL: NEGATIVE
CALCIUM SERPL-MCNC: 8.8 MG/DL (ref 8.6–10.2)
CANNABINOIDS UR QL SCN: NEGATIVE
CHLORIDE SERPL-SCNC: 105 MMOL/L (ref 98–107)
CLARITY UR: CLEAR
CO2 SERPL-SCNC: 24 MMOL/L (ref 22–29)
COCAINE UR QL SCN: NEGATIVE
COLOR UR: YELLOW
CREAT SERPL-MCNC: 0.7 MG/DL (ref 0.5–1)
EOSINOPHIL # BLD: 0.04 K/UL (ref 0.05–0.5)
EOSINOPHILS RELATIVE PERCENT: 1 % (ref 0–6)
ERYTHROCYTE [DISTWIDTH] IN BLOOD BY AUTOMATED COUNT: 16 % (ref 11.5–15)
ETHANOLAMINE SERPL-MCNC: <10 MG/DL (ref 0–0.08)
FENTANYL UR QL: NEGATIVE
GFR, ESTIMATED: >90 ML/MIN/1.73M2
GLUCOSE BLD-MCNC: 123 MG/DL (ref 74–99)
GLUCOSE SERPL-MCNC: 110 MG/DL (ref 74–99)
GLUCOSE UR STRIP-MCNC: NEGATIVE MG/DL
HCT VFR BLD AUTO: 41.8 % (ref 34–48)
HGB BLD-MCNC: 13.4 G/DL (ref 11.5–15.5)
HGB UR QL STRIP.AUTO: NEGATIVE
IMM GRANULOCYTES # BLD AUTO: <0.03 K/UL (ref 0–0.58)
IMM GRANULOCYTES NFR BLD: 0 % (ref 0–5)
INR PPP: 0.9
KETONES UR STRIP-MCNC: NEGATIVE MG/DL
LEUKOCYTE ESTERASE UR QL STRIP: NEGATIVE
LYMPHOCYTES NFR BLD: 1.83 K/UL (ref 1.5–4)
LYMPHOCYTES RELATIVE PERCENT: 23 % (ref 20–42)
MCH RBC QN AUTO: 28.7 PG (ref 26–35)
MCHC RBC AUTO-ENTMCNC: 32.1 G/DL (ref 32–34.5)
MCV RBC AUTO: 89.5 FL (ref 80–99.9)
METHADONE UR QL: NEGATIVE
MONOCYTES NFR BLD: 0.57 K/UL (ref 0.1–0.95)
MONOCYTES NFR BLD: 7 % (ref 2–12)
NEUTROPHILS NFR BLD: 69 % (ref 43–80)
NEUTS SEG NFR BLD: 5.64 K/UL (ref 1.8–7.3)
NITRITE UR QL STRIP: NEGATIVE
OPIATES UR QL SCN: NEGATIVE
OXYCODONE UR QL SCN: NEGATIVE
PCP UR QL SCN: NEGATIVE
PH UR STRIP: 7 [PH] (ref 5–9)
PLATELET # BLD AUTO: 231 K/UL (ref 130–450)
PMV BLD AUTO: 10.9 FL (ref 7–12)
POTASSIUM SERPL-SCNC: 3.7 MMOL/L (ref 3.5–5)
PROT SERPL-MCNC: 7 G/DL (ref 6.4–8.3)
PROT UR STRIP-MCNC: NEGATIVE MG/DL
PROTHROMBIN TIME: 9.9 SEC (ref 9.3–12.4)
RBC # BLD AUTO: 4.67 M/UL (ref 3.5–5.5)
RBC #/AREA URNS HPF: NORMAL /HPF
SALICYLATES SERPL-MCNC: <0.3 MG/DL (ref 0–30)
SODIUM SERPL-SCNC: 140 MMOL/L (ref 132–146)
SP GR UR STRIP: 1.01 (ref 1–1.03)
TEST INFORMATION: NORMAL
TOXIC TRICYCLIC SC,BLOOD: ABNORMAL
TROPONIN I SERPL HS-MCNC: 6 NG/L (ref 0–9)
UROBILINOGEN UR STRIP-ACNC: 0.2 EU/DL (ref 0–1)
WBC #/AREA URNS HPF: NORMAL /HPF
WBC OTHER # BLD: 8.1 K/UL (ref 4.5–11.5)

## 2024-06-22 PROCEDURE — 72170 X-RAY EXAM OF PELVIS: CPT

## 2024-06-22 PROCEDURE — 80179 DRUG ASSAY SALICYLATE: CPT

## 2024-06-22 PROCEDURE — 85025 COMPLETE CBC W/AUTO DIFF WBC: CPT

## 2024-06-22 PROCEDURE — 80307 DRUG TEST PRSMV CHEM ANLYZR: CPT

## 2024-06-22 PROCEDURE — 80143 DRUG ASSAY ACETAMINOPHEN: CPT

## 2024-06-22 PROCEDURE — 85610 PROTHROMBIN TIME: CPT

## 2024-06-22 PROCEDURE — 80053 COMPREHEN METABOLIC PANEL: CPT

## 2024-06-22 PROCEDURE — 93005 ELECTROCARDIOGRAM TRACING: CPT | Performed by: STUDENT IN AN ORGANIZED HEALTH CARE EDUCATION/TRAINING PROGRAM

## 2024-06-22 PROCEDURE — 99285 EMERGENCY DEPT VISIT HI MDM: CPT

## 2024-06-22 PROCEDURE — 82962 GLUCOSE BLOOD TEST: CPT

## 2024-06-22 PROCEDURE — 71045 X-RAY EXAM CHEST 1 VIEW: CPT

## 2024-06-22 PROCEDURE — 81001 URINALYSIS AUTO W/SCOPE: CPT

## 2024-06-22 PROCEDURE — 72125 CT NECK SPINE W/O DYE: CPT

## 2024-06-22 PROCEDURE — 73130 X-RAY EXAM OF HAND: CPT

## 2024-06-22 PROCEDURE — 84484 ASSAY OF TROPONIN QUANT: CPT

## 2024-06-22 PROCEDURE — G0480 DRUG TEST DEF 1-7 CLASSES: HCPCS

## 2024-06-22 PROCEDURE — 70450 CT HEAD/BRAIN W/O DYE: CPT

## 2024-06-22 RX ORDER — OXYCODONE HYDROCHLORIDE AND ACETAMINOPHEN 5; 325 MG/1; MG/1
1 TABLET ORAL EVERY 8 HOURS PRN
Qty: 6 TABLET | Refills: 0 | Status: SHIPPED | OUTPATIENT
Start: 2024-06-22 | End: 2024-06-24

## 2024-06-22 ASSESSMENT — PAIN SCALES - GENERAL: PAINLEVEL_OUTOF10: 3

## 2024-06-22 NOTE — DISCHARGE INSTRUCTIONS
Please follow-up with your primary care doctor as well as neurosurgery listed in your paperwork.  Please return the hospital for any new or worsening symptoms    Please wear the Aspen collar until following up with neurosurgery    Motrin for any further discomfort, cold or warm pack intermittently may help as well.    XR CHEST PORTABLE   Final Result   No acute process.         XR HAND LEFT (MIN 3 VIEWS)   Final Result   No acute bony abnormality seen within the pelvis.      No acute bony abnormality seen within the left hand with degenerative changes   seen in the 1st carpometacarpal joint.         XR PELVIS (1-2 VIEWS)   Final Result   No acute bony abnormality seen within the pelvis.      No acute bony abnormality seen within the left hand with degenerative changes   seen in the 1st carpometacarpal joint.         CT HEAD WO CONTRAST   Final Result   1. There is no acute intracranial hemorrhage   2. Small frontal scalp contusion   .         CT CSpine W/O Contrast   Final Result   1. The ring of C1 is intact as is the dense   2. There is a large osteophyte extending from the anterior aspect of the   inferior endplate of C3.  There is a fracture through the large osteophyte at   the osteophyte/anterior inferior endplate junction.  This is of no clinical   significance.   3. There is no cervical vertebral body compression fracture and there is no   jumped or perched facet.   4. Significant multilevel degenerative disc and degenerative joint disease.   .

## 2024-06-22 NOTE — ED PROVIDER NOTES
General: No focal deficit present.      Mental Status: She is alert and oriented to person, place, and time.      Sensory: No sensory deficit.      Motor: No weakness.      Comments: Moving all extremities   Psychiatric:         Mood and Affect: Mood is anxious.         Behavior: Behavior normal.      Comments: Bit anxious and shaky          --------------- External Imaging -------------    -------------------- Procedures --------------------    -------------------- MDM --------------------    BP (!) 147/80   Pulse 99   Temp 98.5 °F (36.9 °C)   Resp 20   SpO2 97%       Diagnoses considered, but not limited to, include intracranial abnormality, cervical injury, other injuries from fall, concussion, contusion.     Labwork ordered and interpretation by myself. Details below.   Imaging ordered and interpretations by myself and radiologist. Details below.    Labs Reviewed   CBC WITH AUTO DIFFERENTIAL - Abnormal; Notable for the following components:       Result Value    RDW 16.0 (*)     Eosinophils Absolute 0.04 (*)     All other components within normal limits   COMPREHENSIVE METABOLIC PANEL - Abnormal; Notable for the following components:    Glucose 110 (*)     All other components within normal limits   SERUM DRUG SCREEN - Abnormal; Notable for the following components:    Acetaminophen Level <5 (*)     All other components within normal limits   POCT GLUCOSE - Abnormal; Notable for the following components:    POC Glucose 123 (*)     All other components within normal limits   PROTIME-INR   URINE DRUG SCREEN   TROPONIN   URINALYSIS WITH MICROSCOPIC   POCT GLUCOSE     As interpreted by me, the above displayed labs are abnormal. All other labs obtained during this visit were within normal range or not returned as of this dictation.    ED Course as of 06/23/24 0010   Sat Jun 22, 2024   1356 POCT Glucose(!):    POC Glucose 123(!) [PW]   0461 Patient is a 68-year-old female presenting after mechanical fall off a  CONTRAST  Small scalp contusion, no intracran abnormality [PW]   1628 Spoke with Dr. Metz, neurosurgery, states that patient can follow-up outpatient in office and recommended soft cervical collar for comfort [PW]      ED Course User Index  [JA] Charity Hobson MD  [PW] Parish Can, DO          Medications given include:  Medications - No data to display    See ED course above for MDM unless otherwise stated below.     CT head negative for acute intracranial abnormality. Frontal scalp contusion present. Cervical spine CT showed fracture through an osteophyte of C3, no evidence of vertebral fracture however. CXR, XR pelvis and XR ledft hand negative for acute abnormality. Labwork was unremarkable.     Pt w/ normal motor in upper ext. No paresthesia or weakness. No focal deficits. Further advanced imaging felt unnecessary at this time.     Consulted with Dr. Metz, neurosurg, on cervical finding. Recommended outpt follow up and soft cervical collar for comfort.     Aspen collar placed. Discussed results with pt. Consideration for possible concussion as she was reportedly a bit confused after initial injury. Pt alert and ortiented in ED however. Recommended f/u with her neurosurg and pcp. Return precautions were given. Recommended home symptomatic care. Prescription for short course of percocet given. Pt was agreeable to plan and d/c'd home.       -------------------- Consults --------------------  (Unless stated prior)  IP CONSULT TO NEUROSURGERY    -------------------- RESULTS --------------------    Labs:  Results for orders placed or performed during the hospital encounter of 06/22/24   CBC with Auto Differential   Result Value Ref Range    WBC 8.1 4.5 - 11.5 k/uL    RBC 4.67 3.50 - 5.50 m/uL    Hemoglobin 13.4 11.5 - 15.5 g/dL    Hematocrit 41.8 34.0 - 48.0 %    MCV 89.5 80.0 - 99.9 fL    MCH 28.7 26.0 - 35.0 pg    MCHC 32.1 32.0 - 34.5 g/dL    RDW 16.0 (H) 11.5 - 15.0 %    Platelets 231 130 - 918

## 2024-06-24 ENCOUNTER — APPOINTMENT (OUTPATIENT)
Dept: GENERAL RADIOLOGY | Age: 69
End: 2024-06-24
Payer: MEDICARE

## 2024-06-24 ENCOUNTER — HOSPITAL ENCOUNTER (EMERGENCY)
Age: 69
Discharge: HOME | End: 2024-06-24
Attending: EMERGENCY MEDICINE
Payer: MEDICARE

## 2024-06-24 ENCOUNTER — APPOINTMENT (OUTPATIENT)
Dept: CT IMAGING | Age: 69
End: 2024-06-24
Payer: MEDICARE

## 2024-06-24 VITALS
DIASTOLIC BLOOD PRESSURE: 76 MMHG | BODY MASS INDEX: 27.48 KG/M2 | OXYGEN SATURATION: 93 % | HEART RATE: 88 BPM | TEMPERATURE: 98 F | HEIGHT: 60 IN | RESPIRATION RATE: 21 BRPM | WEIGHT: 140 LBS | SYSTOLIC BLOOD PRESSURE: 127 MMHG

## 2024-06-24 DIAGNOSIS — Z87.828 HX OF HEAD INJURY: ICD-10-CM

## 2024-06-24 DIAGNOSIS — R55 NEAR SYNCOPE: Primary | ICD-10-CM

## 2024-06-24 LAB
ALBUMIN SERPL-MCNC: 3.7 G/DL (ref 3.5–5.2)
ALP SERPL-CCNC: 62 U/L (ref 35–104)
ALT SERPL-CCNC: 11 U/L (ref 0–32)
ANION GAP SERPL CALCULATED.3IONS-SCNC: 10 MMOL/L (ref 7–16)
AST SERPL-CCNC: 14 U/L (ref 0–31)
BASOPHILS # BLD: 0.02 K/UL (ref 0–0.2)
BASOPHILS NFR BLD: 0 % (ref 0–2)
BILIRUB SERPL-MCNC: 0.8 MG/DL (ref 0–1.2)
BNP SERPL-MCNC: 108 PG/ML (ref 0–125)
BUN SERPL-MCNC: 15 MG/DL (ref 6–23)
CALCIUM SERPL-MCNC: 8.6 MG/DL (ref 8.6–10.2)
CHLORIDE SERPL-SCNC: 105 MMOL/L (ref 98–107)
CO2 SERPL-SCNC: 28 MMOL/L (ref 22–29)
CREAT SERPL-MCNC: 0.7 MG/DL (ref 0.5–1)
EKG ATRIAL RATE: 85 BPM
EKG ATRIAL RATE: 93 BPM
EKG P AXIS: 63 DEGREES
EKG P AXIS: 73 DEGREES
EKG P-R INTERVAL: 122 MS
EKG P-R INTERVAL: 126 MS
EKG Q-T INTERVAL: 384 MS
EKG Q-T INTERVAL: 396 MS
EKG QRS DURATION: 112 MS
EKG QRS DURATION: 118 MS
EKG QTC CALCULATION (BAZETT): 471 MS
EKG QTC CALCULATION (BAZETT): 477 MS
EKG R AXIS: 58 DEGREES
EKG R AXIS: 67 DEGREES
EKG T AXIS: 50 DEGREES
EKG T AXIS: 61 DEGREES
EKG VENTRICULAR RATE: 85 BPM
EKG VENTRICULAR RATE: 93 BPM
EOSINOPHIL # BLD: 0.07 K/UL (ref 0.05–0.5)
EOSINOPHILS RELATIVE PERCENT: 1 % (ref 0–6)
ERYTHROCYTE [DISTWIDTH] IN BLOOD BY AUTOMATED COUNT: 16 % (ref 11.5–15)
GFR, ESTIMATED: >90 ML/MIN/1.73M2
GLUCOSE SERPL-MCNC: 103 MG/DL (ref 74–99)
HCT VFR BLD AUTO: 38.7 % (ref 34–48)
HGB BLD-MCNC: 12.3 G/DL (ref 11.5–15.5)
IMM GRANULOCYTES # BLD AUTO: 0.05 K/UL (ref 0–0.58)
IMM GRANULOCYTES NFR BLD: 1 % (ref 0–5)
LYMPHOCYTES NFR BLD: 1.47 K/UL (ref 1.5–4)
LYMPHOCYTES RELATIVE PERCENT: 14 % (ref 20–42)
MCH RBC QN AUTO: 29.1 PG (ref 26–35)
MCHC RBC AUTO-ENTMCNC: 31.8 G/DL (ref 32–34.5)
MCV RBC AUTO: 91.7 FL (ref 80–99.9)
MONOCYTES NFR BLD: 0.82 K/UL (ref 0.1–0.95)
MONOCYTES NFR BLD: 8 % (ref 2–12)
NEUTROPHILS NFR BLD: 77 % (ref 43–80)
NEUTS SEG NFR BLD: 7.91 K/UL (ref 1.8–7.3)
PLATELET # BLD AUTO: 208 K/UL (ref 130–450)
PMV BLD AUTO: 11 FL (ref 7–12)
POTASSIUM SERPL-SCNC: 3.8 MMOL/L (ref 3.5–5)
PROT SERPL-MCNC: 6.3 G/DL (ref 6.4–8.3)
RBC # BLD AUTO: 4.22 M/UL (ref 3.5–5.5)
SODIUM SERPL-SCNC: 143 MMOL/L (ref 132–146)
TROPONIN I SERPL HS-MCNC: <6 NG/L (ref 0–9)
WBC OTHER # BLD: 10.3 K/UL (ref 4.5–11.5)

## 2024-06-24 PROCEDURE — 6360000002 HC RX W HCPCS

## 2024-06-24 PROCEDURE — 96374 THER/PROPH/DIAG INJ IV PUSH: CPT

## 2024-06-24 PROCEDURE — 70450 CT HEAD/BRAIN W/O DYE: CPT

## 2024-06-24 PROCEDURE — 2580000003 HC RX 258

## 2024-06-24 PROCEDURE — 99285 EMERGENCY DEPT VISIT HI MDM: CPT

## 2024-06-24 PROCEDURE — 93010 ELECTROCARDIOGRAM REPORT: CPT | Performed by: INTERNAL MEDICINE

## 2024-06-24 PROCEDURE — 6370000000 HC RX 637 (ALT 250 FOR IP)

## 2024-06-24 PROCEDURE — 93005 ELECTROCARDIOGRAM TRACING: CPT

## 2024-06-24 PROCEDURE — 71045 X-RAY EXAM CHEST 1 VIEW: CPT

## 2024-06-24 PROCEDURE — 85025 COMPLETE CBC W/AUTO DIFF WBC: CPT

## 2024-06-24 PROCEDURE — 84484 ASSAY OF TROPONIN QUANT: CPT

## 2024-06-24 PROCEDURE — 80053 COMPREHEN METABOLIC PANEL: CPT

## 2024-06-24 PROCEDURE — 96375 TX/PRO/DX INJ NEW DRUG ADDON: CPT

## 2024-06-24 PROCEDURE — 83880 ASSAY OF NATRIURETIC PEPTIDE: CPT

## 2024-06-24 RX ORDER — METOCLOPRAMIDE HYDROCHLORIDE 5 MG/ML
10 INJECTION INTRAMUSCULAR; INTRAVENOUS ONCE
Status: COMPLETED | OUTPATIENT
Start: 2024-06-24 | End: 2024-06-24

## 2024-06-24 RX ORDER — 0.9 % SODIUM CHLORIDE 0.9 %
1000 INTRAVENOUS SOLUTION INTRAVENOUS ONCE
Status: COMPLETED | OUTPATIENT
Start: 2024-06-24 | End: 2024-06-24

## 2024-06-24 RX ORDER — DIPHENHYDRAMINE HYDROCHLORIDE 50 MG/ML
25 INJECTION INTRAMUSCULAR; INTRAVENOUS ONCE
Status: COMPLETED | OUTPATIENT
Start: 2024-06-24 | End: 2024-06-24

## 2024-06-24 RX ORDER — ACETAMINOPHEN 500 MG
1000 TABLET ORAL ONCE
Status: COMPLETED | OUTPATIENT
Start: 2024-06-24 | End: 2024-06-24

## 2024-06-24 RX ADMIN — SODIUM CHLORIDE 1000 ML: 9 INJECTION, SOLUTION INTRAVENOUS at 08:08

## 2024-06-24 RX ADMIN — DIPHENHYDRAMINE HYDROCHLORIDE 25 MG: 50 INJECTION INTRAMUSCULAR; INTRAVENOUS at 08:08

## 2024-06-24 RX ADMIN — METOCLOPRAMIDE 10 MG: 5 INJECTION, SOLUTION INTRAMUSCULAR; INTRAVENOUS at 08:08

## 2024-06-24 RX ADMIN — ACETAMINOPHEN 1000 MG: 500 TABLET ORAL at 08:02

## 2024-06-24 ASSESSMENT — PAIN SCALES - GENERAL: PAINLEVEL_OUTOF10: 9

## 2024-06-24 ASSESSMENT — LIFESTYLE VARIABLES
HOW OFTEN DO YOU HAVE A DRINK CONTAINING ALCOHOL: NEVER
HOW MANY STANDARD DRINKS CONTAINING ALCOHOL DO YOU HAVE ON A TYPICAL DAY: PATIENT DOES NOT DRINK

## 2024-06-24 ASSESSMENT — PAIN DESCRIPTION - LOCATION: LOCATION: HEAD

## 2024-06-24 NOTE — ED NOTES
Patient arrived to ED via ems for headache and an episode of emesis this AM. Patient reports she was diagnosed with a concussion and a fracture in her neck on Saturday due to a fall. Patient denies chest pain, SOB, or difficulty breathing. Patient states she has had a constant headache since the diagnosis. Patient is a&o x4.

## 2024-06-24 NOTE — ED PROVIDER NOTES
outpatient follow-up [CF]      ED Course User Index  [CF] Hu Granado DO        Social Determinants affecting Dx or Tx: Patient has good medical compliance and fluency as well as established follow-up with family physician.    Records Reviewed: On attempted record review: No significant external or nonemergency department records available at this time.    I am the Primary Clinician of Record.    CONSULTS: (Who and What was discussed)  None    FINAL IMPRESSION      1. Near syncope    2. Hx of head injury          DISPOSITION/PLAN   DISPOSITION Decision To Discharge 06/24/2024 09:25:21 AM    PATIENT REFERRED TO:  Lesli Cheung DO  [address] [id number]  [phone]      For follow up      DISCHARGE MEDICATIONS:  Discharge Medication List as of 6/24/2024  9:26 AM               (Please note that portions of this note were completed with a voice recognition program.  Efforts were made to edit the dictations but occasionally words are mis-transcribed.)    Kris Simpson DO (electronically signed)

## 2024-06-25 LAB
APAP SERPL-MCNC: <5 UG/ML (ref 10–30)
ETHANOLAMINE SERPL-MCNC: <10 MG/DL (ref 0–0.08)
SALICYLATES SERPL-MCNC: <0.3 MG/DL (ref 0–30)
TOXIC TRICYCLIC SC,BLOOD: NEGATIVE

## 2024-07-01 ENCOUNTER — OFFICE VISIT (OUTPATIENT)
Dept: FAMILY MEDICINE CLINIC | Age: 69
End: 2024-07-01
Payer: MEDICARE

## 2024-07-01 VITALS
SYSTOLIC BLOOD PRESSURE: 138 MMHG | HEIGHT: 60 IN | TEMPERATURE: 98.7 F | RESPIRATION RATE: 16 BRPM | HEART RATE: 99 BPM | BODY MASS INDEX: 28.07 KG/M2 | OXYGEN SATURATION: 97 % | DIASTOLIC BLOOD PRESSURE: 88 MMHG | WEIGHT: 143 LBS

## 2024-07-01 DIAGNOSIS — R13.10 DYSPHAGIA, UNSPECIFIED TYPE: ICD-10-CM

## 2024-07-01 DIAGNOSIS — F07.81 POST CONCUSSION SYNDROME: Primary | ICD-10-CM

## 2024-07-01 DIAGNOSIS — K21.9 GASTROESOPHAGEAL REFLUX DISEASE, UNSPECIFIED WHETHER ESOPHAGITIS PRESENT: ICD-10-CM

## 2024-07-01 DIAGNOSIS — S16.1XXD CERVICAL STRAIN, ACUTE, SUBSEQUENT ENCOUNTER: ICD-10-CM

## 2024-07-01 PROCEDURE — G8419 CALC BMI OUT NRM PARAM NOF/U: HCPCS | Performed by: STUDENT IN AN ORGANIZED HEALTH CARE EDUCATION/TRAINING PROGRAM

## 2024-07-01 PROCEDURE — 1123F ACP DISCUSS/DSCN MKR DOCD: CPT | Performed by: STUDENT IN AN ORGANIZED HEALTH CARE EDUCATION/TRAINING PROGRAM

## 2024-07-01 PROCEDURE — 1090F PRES/ABSN URINE INCON ASSESS: CPT | Performed by: STUDENT IN AN ORGANIZED HEALTH CARE EDUCATION/TRAINING PROGRAM

## 2024-07-01 PROCEDURE — 99214 OFFICE O/P EST MOD 30 MIN: CPT | Performed by: STUDENT IN AN ORGANIZED HEALTH CARE EDUCATION/TRAINING PROGRAM

## 2024-07-01 PROCEDURE — 4004F PT TOBACCO SCREEN RCVD TLK: CPT | Performed by: STUDENT IN AN ORGANIZED HEALTH CARE EDUCATION/TRAINING PROGRAM

## 2024-07-01 PROCEDURE — 3079F DIAST BP 80-89 MM HG: CPT | Performed by: STUDENT IN AN ORGANIZED HEALTH CARE EDUCATION/TRAINING PROGRAM

## 2024-07-01 PROCEDURE — 3075F SYST BP GE 130 - 139MM HG: CPT | Performed by: STUDENT IN AN ORGANIZED HEALTH CARE EDUCATION/TRAINING PROGRAM

## 2024-07-01 PROCEDURE — 3017F COLORECTAL CA SCREEN DOC REV: CPT | Performed by: STUDENT IN AN ORGANIZED HEALTH CARE EDUCATION/TRAINING PROGRAM

## 2024-07-01 PROCEDURE — G8427 DOCREV CUR MEDS BY ELIG CLIN: HCPCS | Performed by: STUDENT IN AN ORGANIZED HEALTH CARE EDUCATION/TRAINING PROGRAM

## 2024-07-01 PROCEDURE — G8399 PT W/DXA RESULTS DOCUMENT: HCPCS | Performed by: STUDENT IN AN ORGANIZED HEALTH CARE EDUCATION/TRAINING PROGRAM

## 2024-07-01 RX ORDER — PANTOPRAZOLE SODIUM 40 MG/1
40 TABLET, DELAYED RELEASE ORAL DAILY
Qty: 90 TABLET | Refills: 0 | Status: SHIPPED | OUTPATIENT
Start: 2024-07-01

## 2024-07-01 RX ORDER — BACLOFEN 10 MG/1
5 TABLET ORAL 3 TIMES DAILY
Qty: 21 TABLET | Refills: 0 | Status: SHIPPED | OUTPATIENT
Start: 2024-07-01 | End: 2024-07-15

## 2024-07-01 ASSESSMENT — ENCOUNTER SYMPTOMS
CONSTIPATION: 0
COUGH: 0
TROUBLE SWALLOWING: 1
DIARRHEA: 0
SORE THROAT: 1
NAUSEA: 1
ABDOMINAL PAIN: 0
SHORTNESS OF BREATH: 0
VOMITING: 0

## 2024-07-01 NOTE — PROGRESS NOTES
[facility]  Family Medicine Residency  Phone: [phone]  Fax: [phone]    Patient:  Doreen Nunez 68 y.o. female                                 Date of Service: 7/1/24                            Chiefcomplaint:   Chief Complaint   Patient presents with    Shoulder Pain     Bilateral; radiating up to neck & back of head     Fall     Hit head on concrete 6/22/24- seen in ED 6/22/24 & 6/24/24   Concussion & fx in neck     Nausea     Started Saturday - with fatigue - no vomiting          History of Present Illness:     The patient is a 68 y.o. female  presented to the clinic with complaints as above.    Patient recently seen in the emergency department on 6/22 for concussion after fall from a lawn chair and head hit concrete.  Did have loss of consciousness, was not on blood thinners and follow-up visit to ER on 6/24 for headache and emesis. CT head done on both visits demonstrated no acute intracranial process. Ct of the neck showed a fracture of a cervical bone spur.     She has seen neurology, last Monday. They said to observe.     Today patient states still having head pain and neck pain, as well right ear ringing. Has had surgery on both ears. 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 Has a perforation tot he right ear drum. Follows with ENT. She has not seen them since her accident.     Reports she burps a lot and has a lot of acid reflux. She is on Protonix 40 mg daily. She has never had an EGD. Had colonoscopy in the past, 6-7 years ago. As far as she knows there were no problems. Does report some trouble swallowing, feels like food gets caught.     Has felt sick since Saturday. Has had some nausea, no vomiting.       Past Medical History:      Diagnosis Date    Arthritis     Bipolar disorder (HCC)     Breast cancer (HCC) 2019    bilateral    Cancer of female genital organ (HCC)     h/o vaginal cancer [phone]    Chronic back pain     Chronic neck pain     Dr Jean     COPD

## 2024-07-01 NOTE — PROGRESS NOTES
[facility]  Pre[facility]ing Note    Subjective:  Fell- hit head- ER  Head CT- unremarkable  Neck- osteophyte fracture:  followed with neurology  Still having pain in head and neck    Is burping and had GERD  Has trouble swallowing    Felt sick, nausea and vomiting  Felt warm, no chills    ROS otherwise negative     Past medical, surgical, family and social history were reviewed, non-contributory, and unchanged unless otherwise stated.    Objective:    /88   Pulse 99   Temp 98.7 °F (37.1 °C)   Resp 16   Ht 1.524 m (5')   Wt 64.9 kg (143 lb)   SpO2 97%   BMI 27.93 kg/m²     Exam is as noted by resident     Assessment/Plan:  Post concussion syndrome: symptomatic care, monitor symptoms  Cervical strain: Baclofen  GERD: uncontrolled, refer to GI  Follow up as instructed   Attending Physician Statement  I have reviewed the chart, including any radiology or labs. I have seen patient discussed the case, including pertinent history and exam findings with the resident.  I agree with the assessment, plan and orders as documented by the resident.  Please refer to the resident note for additional information.      Electronically signed by AILYN LACY MD on 7/1/2024 at 3:59 PM

## 2024-07-02 DIAGNOSIS — Z91.89 CANDIDATE FOR STATIN THERAPY DUE TO RISK OF FUTURE CARDIOVASCULAR EVENT: ICD-10-CM

## 2024-07-02 RX ORDER — ROSUVASTATIN CALCIUM 20 MG/1
20 TABLET, COATED ORAL NIGHTLY
Qty: 90 TABLET | Refills: 0 | Status: CANCELLED | OUTPATIENT
Start: 2024-07-02

## 2024-07-08 ENCOUNTER — APPOINTMENT (OUTPATIENT)
Dept: CT IMAGING | Age: 69
End: 2024-07-08
Payer: MEDICARE

## 2024-07-08 ENCOUNTER — HOSPITAL ENCOUNTER (EMERGENCY)
Age: 69
Discharge: HOME | End: 2024-07-08
Attending: STUDENT IN AN ORGANIZED HEALTH CARE EDUCATION/TRAINING PROGRAM
Payer: MEDICARE

## 2024-07-08 VITALS
DIASTOLIC BLOOD PRESSURE: 100 MMHG | SYSTOLIC BLOOD PRESSURE: 152 MMHG | HEART RATE: 86 BPM | RESPIRATION RATE: 15 BRPM | TEMPERATURE: 97.7 F | OXYGEN SATURATION: 98 %

## 2024-07-08 DIAGNOSIS — M54.2 NECK PAIN: Primary | ICD-10-CM

## 2024-07-08 PROCEDURE — 72125 CT NECK SPINE W/O DYE: CPT

## 2024-07-08 PROCEDURE — 99284 EMERGENCY DEPT VISIT MOD MDM: CPT

## 2024-07-08 RX ORDER — OXYCODONE HYDROCHLORIDE 5 MG/1
5 TABLET ORAL ONCE
Status: DISCONTINUED | OUTPATIENT
Start: 2024-07-08 | End: 2024-07-08 | Stop reason: HOSPADM

## 2024-07-08 RX ORDER — METHOCARBAMOL 750 MG/1
750 TABLET, FILM COATED ORAL 4 TIMES DAILY
Qty: 40 TABLET | Refills: 0 | Status: SHIPPED | OUTPATIENT
Start: 2024-07-08 | End: 2024-07-18

## 2024-07-08 RX ORDER — ONDANSETRON 4 MG/1
4 TABLET, ORALLY DISINTEGRATING ORAL 3 TIMES DAILY PRN
Qty: 21 TABLET | Refills: 0 | Status: SHIPPED | OUTPATIENT
Start: 2024-07-08

## 2024-07-08 RX ORDER — OXYCODONE HYDROCHLORIDE 5 MG/1
5 TABLET ORAL EVERY 6 HOURS PRN
Qty: 12 TABLET | Refills: 0 | Status: SHIPPED | OUTPATIENT
Start: 2024-07-08 | End: 2024-07-11

## 2024-07-08 ASSESSMENT — PAIN DESCRIPTION - DESCRIPTORS: DESCRIPTORS: SPASM

## 2024-07-08 ASSESSMENT — PAIN SCALES - GENERAL: PAINLEVEL_OUTOF10: 10

## 2024-07-08 ASSESSMENT — PAIN - FUNCTIONAL ASSESSMENT: PAIN_FUNCTIONAL_ASSESSMENT: 0-10

## 2024-07-08 ASSESSMENT — PAIN DESCRIPTION - LOCATION: LOCATION: NECK

## 2024-07-08 NOTE — ED PROVIDER NOTES
of this note:    CT CERVICAL SPINE WO CONTRAST   Final Result   1. Stable acute, mildly distracted, oblique fracture of the large osteophyte   arising from the anterior inferior C3 vertebral body. No interval healing has   occurred.   2. Stable findings of fusion from C4 through C6.   3. Stable minimal anterior subluxation of C7 on T1 which is degenerative,   associated with mild bilateral facet arthropathy and moderate C7-T1 disc   degenerative disease.   4. Stable severe left and moderate right foraminal stenosis at C6-7 and   severe bilateral C4-5 foraminal stenosis from uncovertebral joint hypertrophy.           CT CERVICAL SPINE WO CONTRAST    Result Date: 7/8/2024  EXAMINATION: CT OF THE CERVICAL SPINE WITHOUT CONTRAST 7/8/2024 12:33 pm TECHNIQUE: CT of the cervical spine was performed without the administration of intravenous contrast. Multiplanar reformatted images are provided for review. Automated exposure control, iterative reconstruction, and/or weight based adjustment of the mA/kV was utilized to reduce the radiation dose to as low as reasonably achievable. COMPARISON: CT of the cervical spine from 06/22/2022. HISTORY: ORDERING SYSTEM PROVIDED HISTORY: Hx C3 fracture.   Pain is getting worse. TECHNOLOGIST PROVIDED HISTORY: Reason for exam:->Hx C3 fracture.   Pain is getting worse. Decision Support Exception - unselect if not a suspected or confirmed emergency medical condition->Emergency Medical Condition (MA) FINDINGS: BONES/ALIGNMENT: There is no change in the acute, mildly distracted, oblique fracture of the large osteophyte arising from the anterior inferior C3 vertebral body.  No interval healing has occurred. The findings of fusion from C4 through C6 are stable, with a solid osseous fusion in anatomic alignment.  The anterior C4-5 metal plate and anchoring screws are intact.  The interbody spacers at C4-5 and C5-6 are in anatomic alignment.  There is stable severe bilateral C4-5 foraminal stenosis

## 2024-07-08 NOTE — DISCHARGE INSTR - COC
Address:  Phone:  Fax:    Dialysis Facility (if applicable)   Name:  Address:  Dialysis Schedule:  Phone:  Fax:    / signature: {Esignature:[id number]}    PHYSICIAN SECTION    Prognosis: {Prognosis:4278698767}    Condition at Discharge: { Patient Condition:876240650}    Rehab Potential (if transferring to Rehab): {Prognosis:7395062735}    Recommended Labs or Other Treatments After Discharge: ***    Physician Certification: I certify the above information and transfer of Doreen Nunez  is necessary for the continuing treatment of the diagnosis listed and that she requires {Admit to Appropriate Level of Care:03627} for {GREATER/LESS:859886932} 30 days.     Update Admission H&P: {CHP DME Changes in HandP:984525101}    PHYSICIAN SIGNATURE:  {Esignature:102138235}

## 2024-07-08 NOTE — ED TRIAGE NOTES
FIRST PROVIDER CONTACT ASSESSMENT NOTE       Department of Emergency Medicine                 First Provider Note            24  1:10 PM EDT    Date of Encounter: No admission date for patient encounter.    Patient Name: Doreen Nunez  : 1955  MRN: [id number]    Chief Complaint: Neck Pain (C3 fracture , c/o worsening neck pain)      History of Present Illness:   Doreen Nunez is a 68 y.o. female who presents to the ED for fall off chair.   Fracture C3.    Prior neck surgery with fusion.   Has plate and screws.   Now pain is worse     Focused Physical Exam:  VS:    ED Triage Vitals   BP Temp Temp src Pulse Resp SpO2 Height Weight   -- -- -- -- -- -- -- --        Physical Ex: Constitutional: Alert and non-toxic.    Medical History:  has a past medical history of Arthritis, Bipolar disorder (HCC), Breast cancer (HCC), Cancer of female genital organ (HCC), Chronic back pain, Chronic neck pain, COPD (chronic obstructive pulmonary disease) (HCC), Depression, Emphysema, Eustachian tube dysfunction, bilateral, GERD (gastroesophageal reflux disease), Headache(784.0), Hx antineoplastic chemo, Hyperlipidemia, and Osteoporosis.  Surgical History:  has a past surgical history that includes Neck surgery; Inner ear surgery; Tonsillectomy; cervical fusion (2003); Echo Complete (2013); other surgical history (Right, 2015); Forearm surgery (Right); Colonoscopy (2015); Upper gastrointestinal endoscopy (2014, 2015); Mastectomy, bilateral; and Breast biopsy.  Social History:  reports that she has been smoking cigarettes. She has a 17.5 pack-year smoking history. She has never used smokeless tobacco. She reports that she does not drink alcohol and does not use drugs.  Family History: family history includes Breast Cancer (age of onset: 58) in her sister; Cancer in her maternal grandfather; Cancer (age of onset: 54) in her nephew; Cancer (age of onset: 59) in her sister; Depression in her

## 2024-07-20 ENCOUNTER — HOSPITAL ENCOUNTER (EMERGENCY)
Age: 69
Discharge: HOME | End: 2024-07-20
Attending: STUDENT IN AN ORGANIZED HEALTH CARE EDUCATION/TRAINING PROGRAM
Payer: MEDICARE

## 2024-07-20 ENCOUNTER — APPOINTMENT (OUTPATIENT)
Dept: ULTRASOUND IMAGING | Age: 69
End: 2024-07-20
Payer: MEDICARE

## 2024-07-20 ENCOUNTER — APPOINTMENT (OUTPATIENT)
Dept: CT IMAGING | Age: 69
End: 2024-07-20
Payer: MEDICARE

## 2024-07-20 VITALS
TEMPERATURE: 98.2 F | HEART RATE: 68 BPM | RESPIRATION RATE: 16 BRPM | SYSTOLIC BLOOD PRESSURE: 135 MMHG | BODY MASS INDEX: 26.43 KG/M2 | DIASTOLIC BLOOD PRESSURE: 89 MMHG | WEIGHT: 140 LBS | OXYGEN SATURATION: 92 % | HEIGHT: 61 IN

## 2024-07-20 DIAGNOSIS — L03.114 LEFT ARM CELLULITIS: Primary | ICD-10-CM

## 2024-07-20 DIAGNOSIS — M79.602 LEFT ARM PAIN: ICD-10-CM

## 2024-07-20 LAB
ALBUMIN SERPL-MCNC: 4 G/DL (ref 3.5–5.2)
ALP SERPL-CCNC: 72 U/L (ref 35–104)
ALT SERPL-CCNC: 11 U/L (ref 0–32)
ANION GAP SERPL CALCULATED.3IONS-SCNC: 11 MMOL/L (ref 7–16)
AST SERPL-CCNC: 17 U/L (ref 0–31)
BASOPHILS # BLD: 0.03 K/UL (ref 0–0.2)
BASOPHILS NFR BLD: 0 % (ref 0–2)
BILIRUB SERPL-MCNC: 0.8 MG/DL (ref 0–1.2)
BUN SERPL-MCNC: 11 MG/DL (ref 6–23)
CALCIUM SERPL-MCNC: 9.3 MG/DL (ref 8.6–10.2)
CHLORIDE SERPL-SCNC: 101 MMOL/L (ref 98–107)
CO2 SERPL-SCNC: 25 MMOL/L (ref 22–29)
CREAT SERPL-MCNC: 0.6 MG/DL (ref 0.5–1)
EKG ATRIAL RATE: 97 BPM
EKG P AXIS: 63 DEGREES
EKG P-R INTERVAL: 120 MS
EKG Q-T INTERVAL: 360 MS
EKG QRS DURATION: 112 MS
EKG QTC CALCULATION (BAZETT): 457 MS
EKG R AXIS: 58 DEGREES
EKG T AXIS: 53 DEGREES
EKG VENTRICULAR RATE: 97 BPM
EOSINOPHIL # BLD: 0.06 K/UL (ref 0.05–0.5)
EOSINOPHILS RELATIVE PERCENT: 1 % (ref 0–6)
ERYTHROCYTE [DISTWIDTH] IN BLOOD BY AUTOMATED COUNT: 14.6 % (ref 11.5–15)
GFR, ESTIMATED: >90 ML/MIN/1.73M2
GLUCOSE SERPL-MCNC: 91 MG/DL (ref 74–99)
HCT VFR BLD AUTO: 41.4 % (ref 34–48)
HGB BLD-MCNC: 13.5 G/DL (ref 11.5–15.5)
IMM GRANULOCYTES # BLD AUTO: <0.03 K/UL (ref 0–0.58)
IMM GRANULOCYTES NFR BLD: 0 % (ref 0–5)
INR PPP: 1.1
LYMPHOCYTES NFR BLD: 1.09 K/UL (ref 1.5–4)
LYMPHOCYTES RELATIVE PERCENT: 10 % (ref 20–42)
MAGNESIUM SERPL-MCNC: 2.3 MG/DL (ref 1.6–2.6)
MCH RBC QN AUTO: 29 PG (ref 26–35)
MCHC RBC AUTO-ENTMCNC: 32.6 G/DL (ref 32–34.5)
MCV RBC AUTO: 88.8 FL (ref 80–99.9)
MONOCYTES NFR BLD: 0.57 K/UL (ref 0.1–0.95)
MONOCYTES NFR BLD: 5 % (ref 2–12)
NEUTROPHILS NFR BLD: 84 % (ref 43–80)
NEUTS SEG NFR BLD: 9.29 K/UL (ref 1.8–7.3)
PLATELET # BLD AUTO: 214 K/UL (ref 130–450)
PMV BLD AUTO: 10.7 FL (ref 7–12)
POTASSIUM SERPL-SCNC: 3.6 MMOL/L (ref 3.5–5)
PROT SERPL-MCNC: 7.2 G/DL (ref 6.4–8.3)
PROTHROMBIN TIME: 12.1 SEC (ref 9.3–12.4)
RBC # BLD AUTO: 4.66 M/UL (ref 3.5–5.5)
SODIUM SERPL-SCNC: 137 MMOL/L (ref 132–146)
TROPONIN I SERPL HS-MCNC: <6 NG/L (ref 0–9)
WBC OTHER # BLD: 11.1 K/UL (ref 4.5–11.5)

## 2024-07-20 PROCEDURE — 99285 EMERGENCY DEPT VISIT HI MDM: CPT

## 2024-07-20 PROCEDURE — 71275 CT ANGIOGRAPHY CHEST: CPT

## 2024-07-20 PROCEDURE — 80053 COMPREHEN METABOLIC PANEL: CPT

## 2024-07-20 PROCEDURE — 93971 EXTREMITY STUDY: CPT

## 2024-07-20 PROCEDURE — 6360000002 HC RX W HCPCS: Performed by: STUDENT IN AN ORGANIZED HEALTH CARE EDUCATION/TRAINING PROGRAM

## 2024-07-20 PROCEDURE — 93005 ELECTROCARDIOGRAM TRACING: CPT | Performed by: STUDENT IN AN ORGANIZED HEALTH CARE EDUCATION/TRAINING PROGRAM

## 2024-07-20 PROCEDURE — 2580000003 HC RX 258: Performed by: STUDENT IN AN ORGANIZED HEALTH CARE EDUCATION/TRAINING PROGRAM

## 2024-07-20 PROCEDURE — 85610 PROTHROMBIN TIME: CPT

## 2024-07-20 PROCEDURE — 85025 COMPLETE CBC W/AUTO DIFF WBC: CPT

## 2024-07-20 PROCEDURE — 6360000004 HC RX CONTRAST MEDICATION: Performed by: RADIOLOGY

## 2024-07-20 PROCEDURE — 96374 THER/PROPH/DIAG INJ IV PUSH: CPT

## 2024-07-20 PROCEDURE — 84484 ASSAY OF TROPONIN QUANT: CPT

## 2024-07-20 PROCEDURE — 83735 ASSAY OF MAGNESIUM: CPT

## 2024-07-20 RX ORDER — CEPHALEXIN 500 MG/1
500 CAPSULE ORAL 4 TIMES DAILY
Qty: 40 CAPSULE | Refills: 0 | Status: SHIPPED | OUTPATIENT
Start: 2024-07-20 | End: 2024-07-30

## 2024-07-20 RX ORDER — KETOROLAC TROMETHAMINE 15 MG/ML
15 INJECTION, SOLUTION INTRAMUSCULAR; INTRAVENOUS ONCE
Status: DISCONTINUED | OUTPATIENT
Start: 2024-07-20 | End: 2024-07-20 | Stop reason: HOSPADM

## 2024-07-20 RX ADMIN — WATER 2000 MG: 1 INJECTION INTRAMUSCULAR; INTRAVENOUS; SUBCUTANEOUS at 15:46

## 2024-07-20 RX ADMIN — IOPAMIDOL 75 ML: 755 INJECTION, SOLUTION INTRAVENOUS at 16:44

## 2024-07-20 ASSESSMENT — PAIN DESCRIPTION - ORIENTATION: ORIENTATION: LEFT

## 2024-07-20 ASSESSMENT — ENCOUNTER SYMPTOMS
SHORTNESS OF BREATH: 0
NAUSEA: 0
CONSTIPATION: 0
DIARRHEA: 0
COLOR CHANGE: 1
ABDOMINAL PAIN: 0
COUGH: 0
VOMITING: 0

## 2024-07-20 ASSESSMENT — PAIN DESCRIPTION - LOCATION: LOCATION: ARM

## 2024-07-20 ASSESSMENT — PAIN - FUNCTIONAL ASSESSMENT: PAIN_FUNCTIONAL_ASSESSMENT: 0-10

## 2024-07-20 ASSESSMENT — PAIN SCALES - GENERAL: PAINLEVEL_OUTOF10: 6

## 2024-07-20 NOTE — DISCHARGE INSTRUCTIONS
Please return to the ER for any new or worsening symptoms including but not limited to Fever, Chest pain or difficulty breathing, Numbness or weakness anywhere, or Change in/worsening of your RASH  If prescribed, please be sure to  your prescriptions from the pharmacy  Please follow-up with Primary care provider as instructed

## 2024-07-20 NOTE — ED PROVIDER NOTES
[facility] EMERGENCY DEPARTMENT  EMERGENCY DEPARTMENT ENCOUNTER        Pt Name: Doreen Nunez  MRN: [id number]  Birthdate 1955  Date of evaluation: 7/20/2024  Provider: Cheri Parker DO  PCP: Lesli Cheung DO  Note Started: 1:59 PM EDT 7/20/24    CHIEF COMPLAINT       Chief Complaint   Patient presents with    Arm Pain     Left arm- hx of mastectomy. States arm swollen and painful for 3 days       HISTORY OF PRESENT ILLNESS: 1 or more Elements     Limitations to history : None    Doreen Nunez is a 68 y.o. female with past medical history of breast cancer s/p mastectomy of her left side with lymphadenectomy in 2019, COPD, GERD, HTN.  She presents to ED for evaluation of pain swelling redness to her left arm.  Says it has been ongoing for about 3 days; denies any history of DVT or PE.  She does state yesterday and the day before she had a sharp pain at her left upper chest which lasted for couple seconds but then went away; she has not had any shortness of breath or pain with deep breathing.  Says that she uses \"squeezing machines\" periodically to help with edema of her arms but for the past 3 days it has not been helping.  She denies any open wounds anywhere.  She has not had any fevers or chills, palpitations, abdominal pain, nausea vomit diarrhea or abnormal urinary symptoms.  Denies any numbness or weakness anywhere.  Denies any falls or injuries.      Nursing Notes were all reviewed and agreed with or any disagreements were addressed in the HPI.      REVIEW OF EXTERNAL NOTE :       Reviewed documentation from office visit with family medicine on 7/1/2024.    Chart Review/External Note Review    Last Echo reviewed by Me:  No results found for: \"LVEF\", \"LVEFMODE\"        Controlled Substance Monitoring:    Acute and Chronic Pain Monitoring:        No data to display                      REVIEW OF SYSTEMS :      Review of Systems   Constitutional:  Negative

## 2024-07-22 LAB
EKG ATRIAL RATE: 97 BPM
EKG P AXIS: 63 DEGREES
EKG P-R INTERVAL: 120 MS
EKG Q-T INTERVAL: 360 MS
EKG QRS DURATION: 112 MS
EKG QTC CALCULATION (BAZETT): 457 MS
EKG R AXIS: 58 DEGREES
EKG T AXIS: 53 DEGREES
EKG VENTRICULAR RATE: 97 BPM

## 2024-07-22 PROCEDURE — 93010 ELECTROCARDIOGRAM REPORT: CPT | Performed by: INTERNAL MEDICINE

## 2024-07-25 ENCOUNTER — OFFICE VISIT (OUTPATIENT)
Dept: PHYSICAL MEDICINE AND REHAB | Age: 69
End: 2024-07-25
Payer: MEDICARE

## 2024-07-25 VITALS
HEIGHT: 61 IN | SYSTOLIC BLOOD PRESSURE: 122 MMHG | HEART RATE: 94 BPM | DIASTOLIC BLOOD PRESSURE: 72 MMHG | WEIGHT: 142 LBS | BODY MASS INDEX: 26.81 KG/M2 | OXYGEN SATURATION: 96 %

## 2024-07-25 DIAGNOSIS — M62.838 SPASM OF MUSCLE: ICD-10-CM

## 2024-07-25 DIAGNOSIS — M25.551 GREATER TROCHANTERIC PAIN SYNDROME OF BOTH LOWER EXTREMITIES: Primary | ICD-10-CM

## 2024-07-25 DIAGNOSIS — M54.2 NECK PAIN: ICD-10-CM
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
## 2024-07-25 DIAGNOSIS — M25.552 GREATER TROCHANTERIC PAIN SYNDROME OF BOTH LOWER EXTREMITIES: Primary | ICD-10-CM

## 2024-07-25 PROCEDURE — 3017F COLORECTAL CA SCREEN DOC REV: CPT | Performed by: STUDENT IN AN ORGANIZED HEALTH CARE EDUCATION/TRAINING PROGRAM

## 2024-07-25 PROCEDURE — 1123F ACP DISCUSS/DSCN MKR DOCD: CPT | Performed by: STUDENT IN AN ORGANIZED HEALTH CARE EDUCATION/TRAINING PROGRAM

## 2024-07-25 PROCEDURE — 3078F DIAST BP <80 MM HG: CPT | Performed by: STUDENT IN AN ORGANIZED HEALTH CARE EDUCATION/TRAINING PROGRAM

## 2024-07-25 PROCEDURE — G8419 CALC BMI OUT NRM PARAM NOF/U: HCPCS | Performed by: STUDENT IN AN ORGANIZED HEALTH CARE EDUCATION/TRAINING PROGRAM

## 2024-07-25 PROCEDURE — G8427 DOCREV CUR MEDS BY ELIG CLIN: HCPCS | Performed by: STUDENT IN AN ORGANIZED HEALTH CARE EDUCATION/TRAINING PROGRAM

## 2024-07-25 PROCEDURE — 99214 OFFICE O/P EST MOD 30 MIN: CPT | Performed by: STUDENT IN AN ORGANIZED HEALTH CARE EDUCATION/TRAINING PROGRAM

## 2024-07-25 PROCEDURE — 1090F PRES/ABSN URINE INCON ASSESS: CPT | Performed by: STUDENT IN AN ORGANIZED HEALTH CARE EDUCATION/TRAINING PROGRAM

## 2024-07-25 PROCEDURE — 3074F SYST BP LT 130 MM HG: CPT | Performed by: STUDENT IN AN ORGANIZED HEALTH CARE EDUCATION/TRAINING PROGRAM

## 2024-07-25 PROCEDURE — 4004F PT TOBACCO SCREEN RCVD TLK: CPT | Performed by: STUDENT IN AN ORGANIZED HEALTH CARE EDUCATION/TRAINING PROGRAM

## 2024-07-25 PROCEDURE — G8399 PT W/DXA RESULTS DOCUMENT: HCPCS | Performed by: STUDENT IN AN ORGANIZED HEALTH CARE EDUCATION/TRAINING PROGRAM

## 2024-07-25 RX ORDER — METHOCARBAMOL 500 MG/1
500 TABLET, FILM COATED ORAL DAILY PRN
Qty: 30 TABLET | Refills: 0 | Status: SHIPPED | OUTPATIENT
Start: 2024-07-25 | End: 2024-08-24

## 2024-07-25 RX ORDER — LIDOCAINE 50 MG/G
1 PATCH TOPICAL DAILY
Qty: 10 PATCH | Refills: 0 | Status: SHIPPED | OUTPATIENT
Start: 2024-07-25 | End: 2024-08-04

## 2024-07-25 NOTE — PROGRESS NOTES
Blanquita Cade MD  Physical Medicine & Rehabilitation  [address] [address]  [phone]     8/5/24    Chief Complaint   Patient presents with    Back Pain     Follow up back pain.  No pain currently.  Pt. States she took it easy on  her back - -wound up in ER due to fall - took muscle relaxers which help greatly.  Just started back up exercising.          HPI:  Doreen Nunez is a 68 y.o. year old woman seen today in follow up regarding chronic low back pain.     Interval history: Since the last visit the patient reports that her low back is improving.  She states that she did get a steroid injection from [facility] on Monday which seems to have calmed down her posterior leg pain. Previously had injections in bilateral greater trochanter bursae previously and the left helped but right did not.  Feet are still painful but the inserts are helping her.  Today, the pain is rated Pain Score:   0 - No pain where 0 is no pain and 10 is pain as bad as it can be.     Neck pain is present, slightly worse after her recent fall. She is following with [facility] as well and was told that this should improve with time. She is taking muscle relaxers as needed, trying to wean down. Was originally prescribed baclofen which did not help.  Feels like overall symptoms are improving. No headaches. No light sensitivity. Endorses chronic issues with sleep. No irritability.    Past Medical History:   Diagnosis Date    Arthritis     Bipolar disorder ([facility])     Breast cancer ([facility]) 2019    bilateral    Cancer of female genital organ ([facility])     h/o vaginal cancer [phone]    Chronic back pain     Chronic neck pain     Dr Jean     COPD (chronic obstructive pulmonary disease) ([facility])     Depression     Emphysema     Eustachian tube dysfunction, bilateral     GERD (gastroesophageal reflux disease)     Headache(784.0)     Hx antineoplastic chemo     Hyperlipidemia     Osteoporosis     Dr. Muniz, Reclast injections

## 2024-07-30 ENCOUNTER — OFFICE VISIT (OUTPATIENT)
Dept: SURGERY | Age: 69
End: 2024-07-30
Payer: MEDICARE

## 2024-07-30 VITALS
HEIGHT: 61 IN | HEART RATE: 100 BPM | DIASTOLIC BLOOD PRESSURE: 89 MMHG | SYSTOLIC BLOOD PRESSURE: 141 MMHG | WEIGHT: 140 LBS | OXYGEN SATURATION: 96 % | BODY MASS INDEX: 26.43 KG/M2 | TEMPERATURE: 97 F

## 2024-07-30 DIAGNOSIS — R13.14 PHARYNGOESOPHAGEAL DYSPHAGIA: ICD-10-CM

## 2024-07-30 DIAGNOSIS — K21.9 GASTROESOPHAGEAL REFLUX DISEASE, UNSPECIFIED WHETHER ESOPHAGITIS PRESENT: Primary | ICD-10-CM

## 2024-07-30 PROCEDURE — 1123F ACP DISCUSS/DSCN MKR DOCD: CPT | Performed by: SURGERY

## 2024-07-30 PROCEDURE — G8427 DOCREV CUR MEDS BY ELIG CLIN: HCPCS | Performed by: SURGERY

## 2024-07-30 PROCEDURE — 3017F COLORECTAL CA SCREEN DOC REV: CPT | Performed by: SURGERY

## 2024-07-30 PROCEDURE — 3077F SYST BP >= 140 MM HG: CPT | Performed by: SURGERY

## 2024-07-30 PROCEDURE — 4004F PT TOBACCO SCREEN RCVD TLK: CPT | Performed by: SURGERY

## 2024-07-30 PROCEDURE — 99204 OFFICE O/P NEW MOD 45 MIN: CPT | Performed by: SURGERY

## 2024-07-30 PROCEDURE — G8419 CALC BMI OUT NRM PARAM NOF/U: HCPCS | Performed by: SURGERY

## 2024-07-30 PROCEDURE — 3079F DIAST BP 80-89 MM HG: CPT | Performed by: SURGERY

## 2024-07-30 PROCEDURE — G8399 PT W/DXA RESULTS DOCUMENT: HCPCS | Performed by: SURGERY

## 2024-07-30 PROCEDURE — 1090F PRES/ABSN URINE INCON ASSESS: CPT | Performed by: SURGERY

## 2024-07-30 NOTE — PROGRESS NOTES
[facility]    Assessment/Plan:      Diagnosis Orders   1. Gastroesophageal reflux disease, unspecified whether esophagitis present      Continue Protonix as prescribed as her symptoms are controlled.  Dietary modification.      2. Pharyngoesophageal dysphagia      Will plan for EGD with possible dilation.            Return for EGD.      Chief Complaint   Patient presents with    New Patient    Gastroesophageal Reflux     GERD       PCP: Lesli Cheung DO    HPI: Doreen Nunez is a 68 y.o. female who presents in consultation for GERD.  She has had reflux for at least 15 years.  She is on Protonix daily.  That controls her symptoms for the most part.  However if she stops that she will get recurrent reflux.  She will also even get breakthrough reflux with certain foods.  She also notes occasional pharyngoesophageal dysphagia.  This is been intermittent over the last 5 years or so.  She has never had EGD previously.  She does have dentures she states.  She also had an anterior cervical fusion.  She was also told she had a bone spur in the neck.  She says she recently had an osteophyte fracture.  She had a CT of her neck which report shows stable mildly distracted oblique fracture of a large osteophyte on C3.  There is no active healing.  Otherwise she has stable findings of her fusion.      Past Medical History:   Diagnosis Date    Arthritis     Bipolar disorder ([facility])     Breast cancer ([facility]) 2019    bilateral    Cancer of female genital organ ([facility])     h/o vaginal cancer [phone]    Chronic back pain     Chronic neck pain     Dr Jean     COPD (chronic obstructive pulmonary disease) ([facility])     Depression     Emphysema     Eustachian tube dysfunction, bilateral     GERD (gastroesophageal reflux disease)     Headache(784.0)     Hx antineoplastic chemo     Hyperlipidemia     Osteoporosis     Dr. Muniz, Reclast injections       Past Surgical History:   Procedure Laterality Date    BREAST BIOPSY

## 2024-08-08 ENCOUNTER — TELEPHONE (OUTPATIENT)
Dept: SURGERY | Age: 69
End: 2024-08-08

## 2024-08-08 ENCOUNTER — PREP FOR PROCEDURE (OUTPATIENT)
Dept: SURGERY | Age: 69
End: 2024-08-08

## 2024-08-08 DIAGNOSIS — R13.14 PHARYNGOESOPHAGEAL DYSPHAGIA: ICD-10-CM

## 2024-08-08 NOTE — TELEPHONE ENCOUNTER
Prior Authorization Form:      DEMOGRAPHICS:                     Patient Name:  Radha Nunez  Patient :  1955            Insurance:  Payor: [facility] MEDICARE / Plan: [employer] DUAL COMPLETE / Product Type: *No Product type* /   Insurance ID Number:    Payer/Plan Subscr  Sex Relation Sub. Ins. ID Effective Group Num   1. [facility] MEDICARE * RADHA NUNEZ 1955 Female Self [id number]                                    [id number]         DIAGNOSIS & PROCEDURE:                       Procedure/Operation: EGD with dilation           CPT Code: 72682    Diagnosis:  GERD/Pharyngoesophageal dysphagia    ICD10 Code: K21.9/R13.14    Location:  [facility]    Surgeon:  Dr Langston    SCHEDULING INFORMATION:                          Date: 24    Time: 10:45 am              Anesthesia:  LMAC                                                       Status:  Outpatient        Special Comments:         Electronically signed by Krystina Gannon MA on 2024 at 3:18 PM

## 2024-08-08 NOTE — TELEPHONE ENCOUNTER
Doreen Nunez is scheduled for EGD with dilation with Dr Langston on 09-25-24 at [facility]. Patient needs to be NPO after midnight the night before procedure. All surgery instructions were explained to the patient and a surgery letter was also mailed out. MA informed patient that PAT will also be calling to review pre-op instructions and medications. Patient verbalized understanding.  Electronically signed by Krystina Gannon MA on 8/8/2024 at 3:17 PM

## 2024-08-15 DIAGNOSIS — Z91.89 CANDIDATE FOR STATIN THERAPY DUE TO RISK OF FUTURE CARDIOVASCULAR EVENT: ICD-10-CM

## 2024-08-15 RX ORDER — ROSUVASTATIN CALCIUM 20 MG/1
20 TABLET, COATED ORAL NIGHTLY
Qty: 90 TABLET | Refills: 0 | Status: SHIPPED | OUTPATIENT
Start: 2024-08-15

## 2024-08-29 ENCOUNTER — OFFICE VISIT (OUTPATIENT)
Dept: FAMILY MEDICINE CLINIC | Age: 69
End: 2024-08-29
Payer: MEDICARE

## 2024-08-29 VITALS
DIASTOLIC BLOOD PRESSURE: 87 MMHG | OXYGEN SATURATION: 97 % | BODY MASS INDEX: 26.45 KG/M2 | TEMPERATURE: 97.8 F | HEART RATE: 79 BPM | RESPIRATION RATE: 17 BRPM | WEIGHT: 140 LBS | SYSTOLIC BLOOD PRESSURE: 131 MMHG

## 2024-08-29 DIAGNOSIS — R30.0 DYSURIA: Primary | ICD-10-CM

## 2024-08-29 DIAGNOSIS — N95.2 ATROPHIC VAGINITIS: ICD-10-CM

## 2024-08-29 LAB
BILIRUBIN, POC: NORMAL
BLOOD URINE, POC: NORMAL
CLARITY, POC: CLEAR
COLOR, POC: NORMAL
GLUCOSE URINE, POC: NORMAL
KETONES, POC: NORMAL
LEUKOCYTE EST, POC: NORMAL
NITRITE, POC: NORMAL
PH, POC: 6
PROTEIN, POC: NORMAL
SPECIFIC GRAVITY, POC: 1.02
UROBILINOGEN, POC: 0.2

## 2024-08-29 PROCEDURE — 3079F DIAST BP 80-89 MM HG: CPT

## 2024-08-29 PROCEDURE — 1090F PRES/ABSN URINE INCON ASSESS: CPT

## 2024-08-29 PROCEDURE — 99213 OFFICE O/P EST LOW 20 MIN: CPT

## 2024-08-29 PROCEDURE — 4004F PT TOBACCO SCREEN RCVD TLK: CPT

## 2024-08-29 PROCEDURE — 81002 URINALYSIS NONAUTO W/O SCOPE: CPT

## 2024-08-29 PROCEDURE — G8399 PT W/DXA RESULTS DOCUMENT: HCPCS

## 2024-08-29 PROCEDURE — 3017F COLORECTAL CA SCREEN DOC REV: CPT

## 2024-08-29 PROCEDURE — G8419 CALC BMI OUT NRM PARAM NOF/U: HCPCS

## 2024-08-29 PROCEDURE — 3075F SYST BP GE 130 - 139MM HG: CPT

## 2024-08-29 PROCEDURE — 1123F ACP DISCUSS/DSCN MKR DOCD: CPT

## 2024-08-29 PROCEDURE — G8427 DOCREV CUR MEDS BY ELIG CLIN: HCPCS

## 2024-08-29 RX ORDER — CONJUGATED ESTROGENS 0.62 MG/G
CREAM VAGINAL
Qty: 30 G | Refills: 0 | Status: SHIPPED | OUTPATIENT
Start: 2024-08-29

## 2024-08-29 NOTE — PROGRESS NOTES
[facility]  Department of Family Medicine  Family Medicine Residency Program    Doreen Nunez (:  1955) is a 68 y.o. female,Established patient, here for evaluation of the following chief complaint(s):  Urinary Tract Infection (Has been taking OTC medication for UTI (Cystex), no relief./Main symptom is burning while urinating)      ASSESSMENT/PLAN:    1. Dysuria  UA negative for nitrite and leukocyte esterase. Will send for culture. Suspect likely secondary to atrophic vaginitis. Reinstitute Premarin as below.   - POCT Urinalysis no Micro  - Culture, Urine    2. Atrophic vaginitis  - estrogens conjugated (PREMARIN) 0.625 MG/GM CREA vaginal cream; Twice weekly  Dispense: 30 g; Refill: 0      Return if symptoms worsen or fail to improve.    SUBJECTIVE/OBJECTIVE:  HPI    Dysuria  - 5 days duration  - not with every urination  - some blood on one occasion when wiping, not hematuria  - has been using cystex the past few days  - no new sexual partners  - feels irritation directly under the urethra    Hx of atrophic vaginitis  - has not been using Premarin for many months          Diagnosis Date    Arthritis     Bipolar disorder ([facility])     Breast cancer ([facility]) 2019    bilateral    Cancer of female genital organ ([facility])     h/o vaginal cancer [id number]    Chronic back pain     Chronic neck pain     Dr Jean     COPD (chronic obstructive pulmonary disease) ([facility])     Depression     Emphysema     Eustachian tube dysfunction, bilateral     GERD (gastroesophageal reflux disease)     Headache(784.0)     Hx antineoplastic chemo     Hyperlipidemia     Osteoporosis     Dr. Muniz, Reclast injections           Procedure Laterality Date    BREAST BIOPSY      CERVICAL FUSION  2003    ACDF C4-5 & C5-6    COLONOSCOPY  2015    Dr. Harvey    ECHO COMPLETE  2013         FOREARM SURGERY Right     INNER EAR SURGERY      Artificial TM on left, partial on right    MASTECTOMY, BILATERAL      NECK

## 2024-08-29 NOTE — PROGRESS NOTES
[facility]  Precepting Note    Subjective:  Concern for UTI   Burning sensation 4-5 days ago with urination   No discharge  No rashes/lesions  No major changes to urine color, frequency  No flank/back pain   Urine was a bit darker this morning   Did have some blood with wiping a couple of days ago   Taking OTC Cistex  No fevers, chills, sweats  No N/V/D     ROS otherwise negative     Past medical, surgical, family and social history were reviewed, non-contributory, and unchanged unless otherwise stated.    Objective:    /87   Pulse 79   Temp 97.8 °F (36.6 °C) (Temporal)   Resp 17   Wt 63.5 kg (140 lb)   SpO2 97%   BMI 26.45 kg/m²     Exam is as noted by resident with which I agree     Assessment/Plan:  Dysuria -   UA reassuring  Hold Abx pending Cx results     Bleeding presumably from friable skin, atrophic vaginitis   Restart estrogen cream     Precautions explicitly reviewed       Attending Physician Statement  I have reviewed the chart, including any radiology or labs. I have discussed the case, including pertinent history and exam findings with the resident.  I agree with the assessment, plan and orders as documented by the resident.  Please refer to the resident note for additional information.      Electronically signed by Honey Torres MD on 8/29/2024 at 9:16 AM

## 2024-09-11 SDOH — ECONOMIC STABILITY: INCOME INSECURITY: HOW HARD IS IT FOR YOU TO PAY FOR THE VERY BASICS LIKE FOOD, HOUSING, MEDICAL CARE, AND HEATING?: NOT HARD AT ALL

## 2024-09-11 SDOH — ECONOMIC STABILITY: FOOD INSECURITY: WITHIN THE PAST 12 MONTHS, YOU WORRIED THAT YOUR FOOD WOULD RUN OUT BEFORE YOU GOT MONEY TO BUY MORE.: NEVER TRUE

## 2024-09-11 SDOH — ECONOMIC STABILITY: FOOD INSECURITY: WITHIN THE PAST 12 MONTHS, THE FOOD YOU BOUGHT JUST DIDN'T LAST AND YOU DIDN'T HAVE MONEY TO GET MORE.: NEVER TRUE

## 2024-09-11 SDOH — ECONOMIC STABILITY: TRANSPORTATION INSECURITY
IN THE PAST 12 MONTHS, HAS LACK OF TRANSPORTATION KEPT YOU FROM MEETINGS, WORK, OR FROM GETTING THINGS NEEDED FOR DAILY LIVING?: NO

## 2024-09-12 ENCOUNTER — OFFICE VISIT (OUTPATIENT)
Dept: PHYSICAL MEDICINE AND REHAB | Age: 69
End: 2024-09-12
Payer: MEDICARE

## 2024-09-12 DIAGNOSIS — M25.551 RIGHT HIP PAIN: ICD-10-CM

## 2024-09-12 DIAGNOSIS — M54.50 MIDLINE LOW BACK PAIN WITHOUT SCIATICA, UNSPECIFIED CHRONICITY: Primary | ICD-10-CM

## 2024-09-12 PROCEDURE — 1123F ACP DISCUSS/DSCN MKR DOCD: CPT | Performed by: STUDENT IN AN ORGANIZED HEALTH CARE EDUCATION/TRAINING PROGRAM

## 2024-09-12 PROCEDURE — G8419 CALC BMI OUT NRM PARAM NOF/U: HCPCS | Performed by: STUDENT IN AN ORGANIZED HEALTH CARE EDUCATION/TRAINING PROGRAM

## 2024-09-12 PROCEDURE — G8427 DOCREV CUR MEDS BY ELIG CLIN: HCPCS | Performed by: STUDENT IN AN ORGANIZED HEALTH CARE EDUCATION/TRAINING PROGRAM

## 2024-09-12 PROCEDURE — 99213 OFFICE O/P EST LOW 20 MIN: CPT | Performed by: STUDENT IN AN ORGANIZED HEALTH CARE EDUCATION/TRAINING PROGRAM

## 2024-09-12 PROCEDURE — G8399 PT W/DXA RESULTS DOCUMENT: HCPCS | Performed by: STUDENT IN AN ORGANIZED HEALTH CARE EDUCATION/TRAINING PROGRAM

## 2024-09-12 PROCEDURE — 1090F PRES/ABSN URINE INCON ASSESS: CPT | Performed by: STUDENT IN AN ORGANIZED HEALTH CARE EDUCATION/TRAINING PROGRAM

## 2024-09-12 PROCEDURE — 3017F COLORECTAL CA SCREEN DOC REV: CPT | Performed by: STUDENT IN AN ORGANIZED HEALTH CARE EDUCATION/TRAINING PROGRAM

## 2024-09-12 PROCEDURE — 4004F PT TOBACCO SCREEN RCVD TLK: CPT | Performed by: STUDENT IN AN ORGANIZED HEALTH CARE EDUCATION/TRAINING PROGRAM

## 2024-09-12 RX ORDER — IBUPROFEN 800 MG/1
800 TABLET, FILM COATED ORAL DAILY PRN
Qty: 90 TABLET | Refills: 0 | Status: SHIPPED | OUTPATIENT
Start: 2024-09-12

## 2024-09-13 ENCOUNTER — OFFICE VISIT (OUTPATIENT)
Dept: FAMILY MEDICINE CLINIC | Age: 69
End: 2024-09-13
Payer: MEDICARE

## 2024-09-13 VITALS
TEMPERATURE: 97.8 F | BODY MASS INDEX: 26.24 KG/M2 | WEIGHT: 139 LBS | OXYGEN SATURATION: 98 % | DIASTOLIC BLOOD PRESSURE: 82 MMHG | HEART RATE: 90 BPM | RESPIRATION RATE: 16 BRPM | HEIGHT: 61 IN | SYSTOLIC BLOOD PRESSURE: 111 MMHG

## 2024-09-13 DIAGNOSIS — Z87.39 HISTORY OF OSTEOPOROSIS: ICD-10-CM

## 2024-09-13 DIAGNOSIS — Z23 NEED FOR IMMUNIZATION AGAINST INFLUENZA: ICD-10-CM

## 2024-09-13 DIAGNOSIS — Z87.39 HISTORY OF OSTEOPOROSIS: Primary | ICD-10-CM

## 2024-09-13 DIAGNOSIS — Z71.6 ENCOUNTER FOR SMOKING CESSATION COUNSELING: ICD-10-CM

## 2024-09-13 PROCEDURE — G0008 ADMIN INFLUENZA VIRUS VAC: HCPCS | Performed by: FAMILY MEDICINE

## 2024-09-13 PROCEDURE — 90653 IIV ADJUVANT VACCINE IM: CPT | Performed by: FAMILY MEDICINE

## 2024-09-13 RX ORDER — VARENICLINE TARTRATE 0.5 MG/1
.5-1 TABLET, FILM COATED ORAL SEE ADMIN INSTRUCTIONS
Qty: 57 TABLET | Refills: 0 | Status: SHIPPED | OUTPATIENT
Start: 2024-09-13

## 2024-09-13 RX ORDER — MELOXICAM 7.5 MG/1
7.5 TABLET ORAL DAILY
COMMUNITY
Start: 2024-07-07

## 2024-09-13 RX ORDER — NICOTINE 21 MG/24HR
1 PATCH, TRANSDERMAL 24 HOURS TRANSDERMAL DAILY
Qty: 14 PATCH | Refills: 1 | Status: SHIPPED | OUTPATIENT
Start: 2024-09-13 | End: 2024-10-11

## 2024-09-15 ENCOUNTER — PATIENT MESSAGE (OUTPATIENT)
Dept: FAMILY MEDICINE CLINIC | Age: 69
End: 2024-09-15

## 2024-09-18 ENCOUNTER — LAB (OUTPATIENT)
Dept: FAMILY MEDICINE CLINIC | Age: 69
End: 2024-09-18

## 2024-09-18 DIAGNOSIS — Z87.39 HISTORY OF OSTEOPOROSIS: ICD-10-CM

## 2024-09-18 DIAGNOSIS — E55.9 VITAMIN D DEFICIENCY: Primary | ICD-10-CM

## 2024-09-18 LAB
ALBUMIN: 4.2 G/DL (ref 3.5–5.2)
ALP BLD-CCNC: 62 U/L (ref 35–104)
ALT SERPL-CCNC: 12 U/L (ref 0–32)
ANION GAP SERPL CALCULATED.3IONS-SCNC: 12 MMOL/L (ref 7–16)
AST SERPL-CCNC: 20 U/L (ref 0–31)
BASOPHILS ABSOLUTE: 0.03 K/UL (ref 0–0.2)
BASOPHILS RELATIVE PERCENT: 1 % (ref 0–2)
BILIRUB SERPL-MCNC: 0.6 MG/DL (ref 0–1.2)
BUN BLDV-MCNC: 12 MG/DL (ref 6–23)
CALCIUM SERPL-MCNC: 9.7 MG/DL (ref 8.6–10.2)
CHLORIDE BLD-SCNC: 106 MMOL/L (ref 98–107)
CHOLESTEROL, TOTAL: 210 MG/DL
CO2: 23 MMOL/L (ref 22–29)
CREAT SERPL-MCNC: 0.6 MG/DL (ref 0.5–1)
EOSINOPHILS ABSOLUTE: 0.1 K/UL (ref 0.05–0.5)
EOSINOPHILS RELATIVE PERCENT: 2 % (ref 0–6)
GFR, ESTIMATED: >90 ML/MIN/1.73M2
GLUCOSE BLD-MCNC: 92 MG/DL (ref 74–99)
HCT VFR BLD CALC: 43.9 % (ref 34–48)
HDLC SERPL-MCNC: 55 MG/DL
HEMOGLOBIN: 13.6 G/DL (ref 11.5–15.5)
IMMATURE GRANULOCYTES %: 0 % (ref 0–5)
IMMATURE GRANULOCYTES ABSOLUTE: <0.03 K/UL (ref 0–0.58)
LDL CHOLESTEROL: 131 MG/DL
LYMPHOCYTES ABSOLUTE: 1.32 K/UL (ref 1.5–4)
LYMPHOCYTES RELATIVE PERCENT: 24 % (ref 20–42)
MCH RBC QN AUTO: 28 PG (ref 26–35)
MCHC RBC AUTO-ENTMCNC: 31 G/DL (ref 32–34.5)
MCV RBC AUTO: 90.3 FL (ref 80–99.9)
MONOCYTES ABSOLUTE: 0.5 K/UL (ref 0.1–0.95)
MONOCYTES RELATIVE PERCENT: 9 % (ref 2–12)
NEUTROPHILS ABSOLUTE: 3.53 K/UL (ref 1.8–7.3)
NEUTROPHILS RELATIVE PERCENT: 64 % (ref 43–80)
PDW BLD-RTO: 14 % (ref 11.5–15)
PLATELET # BLD: 248 K/UL (ref 130–450)
PMV BLD AUTO: 11.2 FL (ref 7–12)
POTASSIUM SERPL-SCNC: 4.2 MMOL/L (ref 3.5–5)
RBC # BLD: 4.86 M/UL (ref 3.5–5.5)
SODIUM BLD-SCNC: 141 MMOL/L (ref 132–146)
TOTAL PROTEIN: 7.1 G/DL (ref 6.4–8.3)
TRIGL SERPL-MCNC: 122 MG/DL
TSH SERPL DL<=0.05 MIU/L-ACNC: 1.31 UIU/ML (ref 0.27–4.2)
VITAMIN D 25-HYDROXY: 25.5 NG/ML (ref 30–100)
VLDLC SERPL CALC-MCNC: 24 MG/DL
WBC # BLD: 5.5 K/UL (ref 4.5–11.5)

## 2024-09-18 RX ORDER — FAMOTIDINE 20 MG
1 TABLET ORAL DAILY
Qty: 60 CAPSULE | Refills: 1 | Status: SHIPPED | OUTPATIENT
Start: 2024-09-18

## 2024-09-23 NOTE — PROGRESS NOTES
[facility] PRE-ADMISSION TESTING INSTRUCTIONS    The Preadmission Testing patient is instructed accordingly using the following criteria (check applicable):    ARRIVAL INSTRUCTIONS:   [x] Parking the day of Surgery is located in the Main Entrance lot.  Upon entering the door, make an immediate right to the surgery reception desk    [x] Bring photo ID and insurance card    [x] Please be sure to arrange for responsible adult to provide transportation to and from the hospital    [x] Please arrange for responsible adult to be with you for the 24 hour period post procedure due to having anesthesia    [x] If you awake am of surgery not feeling well or have temperature >100 please call [phone]    GENERAL INSTRUCTIONS:    [x] Nothing by mouth after midnight, including gum, candy, mints or water    [x] You may brush your teeth, but do not swallow any water    [x] Take medications as instructed with 1-2 oz of water    [x] Stop herbal supplements and vitamins 5 days prior to procedure    [x] Shower or bath with soap, lather and rinse well, AM of Surgery, no lotion, powders or creams    [x] No tobacco products within 24 hours of surgery     [x] No alcohol or illegal drug use within 24 hours of surgery.    [x] Jewelry, body piercing's, eyeglasses, contact lenses and dentures are not permitted into surgery (bring cases)      [x] Please do not wear any nail polish, make up or hair products on the day of surgery    [x] You can expect a call the business day prior to procedure to notify you if your arrival time changes    [x] If you receive a survey after surgery we would greatly appreciate your comments    [x] Please notify surgeon if you develop any illness between now and time of surgery (cold, cough, sore throat, fever, nausea, vomiting) or any signs of infections  including skin, wounds, and dental.    [x]  The Outpatient Pharmacy is available to fill your prescription here on your day of

## 2024-09-24 NOTE — PROGRESS NOTES
Pt LM on PAT voicemail that she is cancelling her EGD appt today... called and spoke with pt whom  states she does not have transportation and is moving and need to reschedule. Instructed pt to call  Office pt philippe understanding

## 2024-09-26 ENCOUNTER — TELEPHONE (OUTPATIENT)
Dept: SURGERY | Age: 69
End: 2024-09-26

## 2024-10-03 ENCOUNTER — HOSPITAL ENCOUNTER (EMERGENCY)
Age: 69
Discharge: HOME | End: 2024-10-03
Attending: STUDENT IN AN ORGANIZED HEALTH CARE EDUCATION/TRAINING PROGRAM
Payer: MEDICARE

## 2024-10-03 ENCOUNTER — APPOINTMENT (OUTPATIENT)
Dept: ULTRASOUND IMAGING | Age: 69
End: 2024-10-03
Payer: MEDICARE

## 2024-10-03 VITALS
WEIGHT: 135 LBS | SYSTOLIC BLOOD PRESSURE: 132 MMHG | TEMPERATURE: 97.7 F | HEIGHT: 61 IN | HEART RATE: 86 BPM | RESPIRATION RATE: 16 BRPM | DIASTOLIC BLOOD PRESSURE: 88 MMHG | BODY MASS INDEX: 25.49 KG/M2 | OXYGEN SATURATION: 98 %

## 2024-10-03 DIAGNOSIS — N93.9 VAGINAL BLEEDING: Primary | ICD-10-CM

## 2024-10-03 LAB
ALBUMIN SERPL-MCNC: 4.1 G/DL (ref 3.5–5.2)
ALP SERPL-CCNC: 67 U/L (ref 35–104)
ALT SERPL-CCNC: 15 U/L (ref 0–32)
ANION GAP SERPL CALCULATED.3IONS-SCNC: 10 MMOL/L (ref 7–16)
AST SERPL-CCNC: 22 U/L (ref 0–31)
BASOPHILS # BLD: 0.03 K/UL (ref 0–0.2)
BASOPHILS NFR BLD: 1 % (ref 0–2)
BILIRUB SERPL-MCNC: 0.9 MG/DL (ref 0–1.2)
BILIRUB UR QL STRIP: NEGATIVE
BUN SERPL-MCNC: 17 MG/DL (ref 6–23)
CALCIUM SERPL-MCNC: 9.1 MG/DL (ref 8.6–10.2)
CHLORIDE SERPL-SCNC: 104 MMOL/L (ref 98–107)
CLARITY UR: ABNORMAL
CO2 SERPL-SCNC: 27 MMOL/L (ref 22–29)
COLOR UR: ABNORMAL
CREAT SERPL-MCNC: 0.7 MG/DL (ref 0.5–1)
EOSINOPHIL # BLD: 0.12 K/UL (ref 0.05–0.5)
EOSINOPHILS RELATIVE PERCENT: 2 % (ref 0–6)
ERYTHROCYTE [DISTWIDTH] IN BLOOD BY AUTOMATED COUNT: 13.9 % (ref 11.5–15)
GFR, ESTIMATED: >90 ML/MIN/1.73M2
GLUCOSE SERPL-MCNC: 95 MG/DL (ref 74–99)
GLUCOSE UR STRIP-MCNC: NEGATIVE MG/DL
HCT VFR BLD AUTO: 39.8 % (ref 34–48)
HGB BLD-MCNC: 12.6 G/DL (ref 11.5–15.5)
HGB UR QL STRIP.AUTO: ABNORMAL
IMM GRANULOCYTES # BLD AUTO: <0.03 K/UL (ref 0–0.58)
IMM GRANULOCYTES NFR BLD: 0 % (ref 0–5)
INR PPP: 1.1
KETONES UR STRIP-MCNC: NEGATIVE MG/DL
LEUKOCYTE ESTERASE UR QL STRIP: NEGATIVE
LYMPHOCYTES NFR BLD: 2.06 K/UL (ref 1.5–4)
LYMPHOCYTES RELATIVE PERCENT: 32 % (ref 20–42)
MAGNESIUM SERPL-MCNC: 2 MG/DL (ref 1.6–2.6)
MCH RBC QN AUTO: 28.3 PG (ref 26–35)
MCHC RBC AUTO-ENTMCNC: 31.7 G/DL (ref 32–34.5)
MCV RBC AUTO: 89.4 FL (ref 80–99.9)
MONOCYTES NFR BLD: 0.64 K/UL (ref 0.1–0.95)
MONOCYTES NFR BLD: 10 % (ref 2–12)
NEUTROPHILS NFR BLD: 56 % (ref 43–80)
NEUTS SEG NFR BLD: 3.62 K/UL (ref 1.8–7.3)
NITRITE UR QL STRIP: NEGATIVE
PH UR STRIP: 6.5 [PH] (ref 5–9)
PLATELET # BLD AUTO: 251 K/UL (ref 130–450)
PMV BLD AUTO: 10.5 FL (ref 7–12)
POTASSIUM SERPL-SCNC: 4.1 MMOL/L (ref 3.5–5)
PROT SERPL-MCNC: 6.4 G/DL (ref 6.4–8.3)
PROT UR STRIP-MCNC: NEGATIVE MG/DL
PROTHROMBIN TIME: 11.2 SEC (ref 9.3–12.4)
RBC # BLD AUTO: 4.45 M/UL (ref 3.5–5.5)
RBC #/AREA URNS HPF: ABNORMAL /HPF
SODIUM SERPL-SCNC: 141 MMOL/L (ref 132–146)
SP GR UR STRIP: 1.02 (ref 1–1.03)
UROBILINOGEN UR STRIP-ACNC: 0.2 EU/DL (ref 0–1)
WBC #/AREA URNS HPF: ABNORMAL /HPF
WBC OTHER # BLD: 6.5 K/UL (ref 4.5–11.5)

## 2024-10-03 PROCEDURE — 85610 PROTHROMBIN TIME: CPT

## 2024-10-03 PROCEDURE — 76856 US EXAM PELVIC COMPLETE: CPT

## 2024-10-03 PROCEDURE — 99284 EMERGENCY DEPT VISIT MOD MDM: CPT

## 2024-10-03 PROCEDURE — 83735 ASSAY OF MAGNESIUM: CPT

## 2024-10-03 PROCEDURE — 81001 URINALYSIS AUTO W/SCOPE: CPT

## 2024-10-03 PROCEDURE — 80053 COMPREHEN METABOLIC PANEL: CPT

## 2024-10-03 PROCEDURE — 85025 COMPLETE CBC W/AUTO DIFF WBC: CPT

## 2024-10-03 ASSESSMENT — ENCOUNTER SYMPTOMS
ABDOMINAL PAIN: 0
COLOR CHANGE: 0
COUGH: 0
NAUSEA: 0
VOMITING: 0
DIARRHEA: 0
CONSTIPATION: 0
SHORTNESS OF BREATH: 0

## 2024-10-03 NOTE — DISCHARGE INSTRUCTIONS
Please return to the ER for any new or worsening symptoms including but not limited to  any abdominal/pelvic pain or increased vaginal bleeding  If prescribed, please be sure to  your prescriptions from the pharmacy  Please follow-up with OB/GYN as instructed      US PELVIS COMPLETE   Final Result   1.  Endometrial cavity simple appearing fluid collection which correlates   with the provided history of vaginal bleeding.  Separate from the fluid, no   suspicious endometrial thickening identified.      2.  Uterine body subcentimeter echogenic focus on the right compatible with a   small fibroid.  No suspicious uterine enlargement.      3.  Nonvisualization of bilateral ovaries.  No free pelvic fluid.      4.  Please see comment below.      COMMENT: Transabdominal imaging was performed.  Transvaginal imaging is the   accepted modality for assessment of postmenopausal bleeding.  Allowing for   this, the endometrium is adequately visualized by current exam.  The patient   reports prior history of uterine cervical cancer.  Gyn specialty follow-up is   recommended.

## 2024-10-03 NOTE — ED PROVIDER NOTES
[facility] EMERGENCY DEPARTMENT  EMERGENCY DEPARTMENT ENCOUNTER        Pt Name: Doreen Nunez  MRN: [id number]  Birthdate 1955  Date of evaluation: 10/3/2024  Provider: Cheri Parker DO  PCP: Lesli Cheung DO  Note Started: 4:12 AM EDT 10/3/24    CHIEF COMPLAINT       Chief Complaint   Patient presents with    Vaginal Bleeding     Woke up this morning with vaginal bleeding,        HISTORY OF PRESENT ILLNESS: 1 or more Elements     Limitations to history : None    Doreen Nunez is a 69 y.o. 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  She denies any pain, but she woke up this morning and noticed blood in her underwear.  She is not on blood thinning medications.  She denies any abdominal pain, nausea vomiting or diarrhea.  She denies any abnormal urinary symptoms.  No history of hysterectomy or oophorectomy.  Besides the ablation of the vaginal cancer in the past, she denies any other gynecologic surgeries.  She follows with Dr. Davenport for gynecology.  Patient denies any other previous recent history of vaginal bleeding.  No recent sexual activity.  No concern for STDs.      Nursing Notes were all reviewed and agreed with or any disagreements were addressed in the HPI.      REVIEW OF EXTERNAL NOTE :       Reviewed documentation from office visit with family medicine on 9/13/2024.    Chart Review/External Note Review    Last Echo reviewed by Me:  No results found for: \"LVEF\", \"LVEFMODE\"        Controlled Substance Monitoring:    Acute and Chronic Pain Monitoring:        No data to display                      REVIEW OF SYSTEMS :      Review of Systems   Constitutional:  Negative for chills and fever.   Respiratory:  Negative for cough and shortness of breath.    Cardiovascular:  Negative for

## 2024-10-08 DIAGNOSIS — H69.93 DYSFUNCTION OF BOTH EUSTACHIAN TUBES: Primary | ICD-10-CM

## 2024-10-09 ENCOUNTER — HOSPITAL ENCOUNTER (OUTPATIENT)
Dept: MRI IMAGING | Age: 69
Discharge: HOME | End: 2024-10-11
Attending: STUDENT IN AN ORGANIZED HEALTH CARE EDUCATION/TRAINING PROGRAM
Payer: MEDICARE

## 2024-10-09 DIAGNOSIS — M25.551 RIGHT HIP PAIN: ICD-10-CM

## 2024-10-09 PROCEDURE — 73721 MRI JNT OF LWR EXTRE W/O DYE: CPT

## 2024-10-09 RX ORDER — FLUTICASONE PROPIONATE 50 MCG
2 SPRAY, SUSPENSION (ML) NASAL DAILY
Qty: 48 G | Refills: 3 | Status: SHIPPED | OUTPATIENT
Start: 2024-10-09

## 2024-10-09 NOTE — TELEPHONE ENCOUNTER
Patient was last seen in office 4/9/24 patient would like a prescription refill for flonase. Next appointment 10/15/24.

## 2024-10-10 DIAGNOSIS — K21.9 GASTROESOPHAGEAL REFLUX DISEASE, UNSPECIFIED WHETHER ESOPHAGITIS PRESENT: Primary | ICD-10-CM

## 2024-10-10 RX ORDER — PANTOPRAZOLE SODIUM 40 MG/1
40 TABLET, DELAYED RELEASE ORAL DAILY
Qty: 30 TABLET | Refills: 0 | Status: SHIPPED
Start: 2024-10-10 | End: 2024-10-14 | Stop reason: SDUPTHER

## 2024-10-14 DIAGNOSIS — K21.9 GASTROESOPHAGEAL REFLUX DISEASE, UNSPECIFIED WHETHER ESOPHAGITIS PRESENT: ICD-10-CM

## 2024-10-14 RX ORDER — PANTOPRAZOLE SODIUM 40 MG/1
40 TABLET, DELAYED RELEASE ORAL DAILY
Qty: 90 TABLET | Refills: 0 | Status: SHIPPED | OUTPATIENT
Start: 2024-10-14

## 2024-10-15 ENCOUNTER — OFFICE VISIT (OUTPATIENT)
Dept: ENT CLINIC | Age: 69
End: 2024-10-15
Payer: MEDICARE

## 2024-10-15 VITALS
BODY MASS INDEX: 25.54 KG/M2 | WEIGHT: 135.3 LBS | HEIGHT: 61 IN | HEART RATE: 92 BPM | OXYGEN SATURATION: 96 % | DIASTOLIC BLOOD PRESSURE: 72 MMHG | SYSTOLIC BLOOD PRESSURE: 110 MMHG

## 2024-10-15 DIAGNOSIS — H69.93 DYSFUNCTION OF BOTH EUSTACHIAN TUBES: ICD-10-CM

## 2024-10-15 PROCEDURE — 3074F SYST BP LT 130 MM HG: CPT

## 2024-10-15 PROCEDURE — 1090F PRES/ABSN URINE INCON ASSESS: CPT

## 2024-10-15 PROCEDURE — G8419 CALC BMI OUT NRM PARAM NOF/U: HCPCS

## 2024-10-15 PROCEDURE — G8482 FLU IMMUNIZE ORDER/ADMIN: HCPCS

## 2024-10-15 PROCEDURE — 3078F DIAST BP <80 MM HG: CPT

## 2024-10-15 PROCEDURE — 1036F TOBACCO NON-USER: CPT

## 2024-10-15 PROCEDURE — 99213 OFFICE O/P EST LOW 20 MIN: CPT

## 2024-10-15 PROCEDURE — 3017F COLORECTAL CA SCREEN DOC REV: CPT

## 2024-10-15 PROCEDURE — G8399 PT W/DXA RESULTS DOCUMENT: HCPCS

## 2024-10-15 PROCEDURE — 1123F ACP DISCUSS/DSCN MKR DOCD: CPT

## 2024-10-15 PROCEDURE — G8427 DOCREV CUR MEDS BY ELIG CLIN: HCPCS

## 2024-10-15 RX ORDER — FLUTICASONE PROPIONATE 50 MCG
2 SPRAY, SUSPENSION (ML) NASAL DAILY
Qty: 48 G | Refills: 3 | Status: SHIPPED | OUTPATIENT
Start: 2024-10-15

## 2024-10-15 ASSESSMENT — ENCOUNTER SYMPTOMS
RHINORRHEA: 0
EYE PAIN: 0
DIARRHEA: 0
SHORTNESS OF BREATH: 0
COUGH: 0
SORE THROAT: 0
EYE DISCHARGE: 0
SINUS PRESSURE: 0
BACK PAIN: 0
VOMITING: 0
ALLERGIC/IMMUNOLOGIC NEGATIVE: 1

## 2024-10-15 NOTE — PROGRESS NOTES
month(s)    RX given today:  none    Nic Blank MSN, FNP-BC  [facility]    The information contained in this note has been dictated using drug and medical speech recognition software and may contain errors

## 2024-10-17 ENCOUNTER — TELEPHONE (OUTPATIENT)
Dept: SURGERY | Age: 69
End: 2024-10-17

## 2024-10-17 NOTE — TELEPHONE ENCOUNTER
Prior Authorization Form:      DEMOGRAPHICS:                     Patient Name:  Radha Nunez  Patient :  1955            Insurance:  Payor: [facility] MEDICARE / Plan: [employer] DUAL COMPLETE / Product Type: *No Product type* /   Insurance ID Number:    Payer/Plan Subscr  Sex Relation Sub. Ins. ID Effective Group Num   1. [facility] MEDICARE * RADHA NUNEZ 1955 Female Self [id number]                                    [id number]         DIAGNOSIS & PROCEDURE:                       Procedure/Operation: EGD with dilation           CPT Code: 03691    Diagnosis:  GERD/pharyngoesophageal dysphagia    ICD10 Code: K21.9/R13.14    Location:  [facility]    Surgeon:  Dr Langston    SCHEDULING INFORMATION:                          Date: 11-15-24    Time: 12:45 pm              Anesthesia:  LMAC                                                       Status:  Outpatient        Special Comments:         Electronically signed by Krystina Gannon MA on 10/17/2024 at 6:26 AM

## 2024-10-17 NOTE — TELEPHONE ENCOUNTER
Doreen Nunez is scheduled for EGD with dilation with Dr Langston on 11-15-24 at [facility]. Patient needs to be NPO after midnight the night before procedure. All surgery instructions were explained to the patient and a surgery letter was also mailed out. MA informed patient that PAT will also be calling to review pre-op instructions and medications. Patient verbalized understanding.  Electronically signed by Krystina Gannon MA on 10/17/2024 at 6:18 AM

## 2024-10-18 ENCOUNTER — OFFICE VISIT (OUTPATIENT)
Dept: PHYSICAL MEDICINE AND REHAB | Age: 69
End: 2024-10-18
Payer: MEDICARE

## 2024-10-18 VITALS
SYSTOLIC BLOOD PRESSURE: 118 MMHG | HEIGHT: 61 IN | DIASTOLIC BLOOD PRESSURE: 76 MMHG | BODY MASS INDEX: 25.47 KG/M2 | WEIGHT: 134.92 LBS

## 2024-10-18 DIAGNOSIS — M25.551 GREATER TROCHANTERIC PAIN SYNDROME OF BOTH LOWER EXTREMITIES: Primary | ICD-10-CM

## 2024-10-18 DIAGNOSIS — S76.011S TEAR OF GLUTEUS MINIMUS TENDON, RIGHT, SEQUELA: ICD-10-CM

## 2024-10-18 DIAGNOSIS — M25.552 GREATER TROCHANTERIC PAIN SYNDROME OF BOTH LOWER EXTREMITIES: Primary | ICD-10-CM

## 2024-10-18 PROCEDURE — 99213 OFFICE O/P EST LOW 20 MIN: CPT | Performed by: STUDENT IN AN ORGANIZED HEALTH CARE EDUCATION/TRAINING PROGRAM

## 2024-10-18 PROCEDURE — 1036F TOBACCO NON-USER: CPT | Performed by: STUDENT IN AN ORGANIZED HEALTH CARE EDUCATION/TRAINING PROGRAM

## 2024-10-18 PROCEDURE — G8419 CALC BMI OUT NRM PARAM NOF/U: HCPCS | Performed by: STUDENT IN AN ORGANIZED HEALTH CARE EDUCATION/TRAINING PROGRAM

## 2024-10-18 PROCEDURE — 3078F DIAST BP <80 MM HG: CPT | Performed by: STUDENT IN AN ORGANIZED HEALTH CARE EDUCATION/TRAINING PROGRAM

## 2024-10-18 PROCEDURE — G8427 DOCREV CUR MEDS BY ELIG CLIN: HCPCS | Performed by: STUDENT IN AN ORGANIZED HEALTH CARE EDUCATION/TRAINING PROGRAM

## 2024-10-18 PROCEDURE — 3074F SYST BP LT 130 MM HG: CPT | Performed by: STUDENT IN AN ORGANIZED HEALTH CARE EDUCATION/TRAINING PROGRAM

## 2024-10-18 PROCEDURE — 1090F PRES/ABSN URINE INCON ASSESS: CPT | Performed by: STUDENT IN AN ORGANIZED HEALTH CARE EDUCATION/TRAINING PROGRAM

## 2024-10-18 PROCEDURE — 1123F ACP DISCUSS/DSCN MKR DOCD: CPT | Performed by: STUDENT IN AN ORGANIZED HEALTH CARE EDUCATION/TRAINING PROGRAM

## 2024-10-18 PROCEDURE — G8399 PT W/DXA RESULTS DOCUMENT: HCPCS | Performed by: STUDENT IN AN ORGANIZED HEALTH CARE EDUCATION/TRAINING PROGRAM

## 2024-10-18 PROCEDURE — G8482 FLU IMMUNIZE ORDER/ADMIN: HCPCS | Performed by: STUDENT IN AN ORGANIZED HEALTH CARE EDUCATION/TRAINING PROGRAM

## 2024-10-18 PROCEDURE — 3017F COLORECTAL CA SCREEN DOC REV: CPT | Performed by: STUDENT IN AN ORGANIZED HEALTH CARE EDUCATION/TRAINING PROGRAM

## 2024-10-18 NOTE — PROGRESS NOTES
Blanquita Cade MD  Physical Medicine & Rehabilitation  [address] [address]  [phone]     10/18/24    Chief Complaint   Patient presents with    Follow-up     Patient presents to review MRI results.        HPI:  Doreen Nunez is a 69 y.o. year old woman seen today in follow up regarding chronic low back pain and     Interval history (10/18/24):  Tried voltaren without a lot of relief. Here to review MRI right hip results. Symptoms slightly improved at this time.     Had left side injection with Dr. Thompson which was helpful in the past.    Past Medical History:   Diagnosis Date    Arthritis     Bipolar disorder ([facility])     Breast cancer ([facility]) 2019    bilateral    Cancer of female genital organ ([facility])     h/o vaginal cancer [id number]    Chronic back pain     Chronic neck pain     Dr Jean     COPD (chronic obstructive pulmonary disease) ([facility])     Depression     Dysphagia     Emphysema     Eustachian tube dysfunction, bilateral     GERD (gastroesophageal reflux disease)     Headache(784.0)     History of tobacco use     Hx antineoplastic chemo     Hyperlipidemia     Hypertension     Osteoporosis     Dr. Muniz, Reclast injections     Current Outpatient Medications   Medication Sig Dispense Refill    fluticasone (FLONASE) 50 MCG/ACT nasal spray 2 sprays by Each Nostril route daily Shake liquid 48 g 3    pantoprazole (PROTONIX) 40 MG tablet Take 1 tablet by mouth daily 90 tablet 0    Vitamin D, Cholecalciferol, 25 MCG (1000 UT) CAPS Take 1,000 Units by mouth daily 60 capsule 1    varenicline (CHANTIX) 0.5 MG tablet Take 1-2 tablets by mouth See Admin Instructions 0.5mg DAILY for 3 days followed by 0.5mg TWICE DAILY for 4 days followed by 1mg TWICE DAILY 57 tablet 0    meloxicam (MOBIC) 7.5 MG tablet Take 1 tablet by mouth daily      ibuprofen (ADVIL;MOTRIN) 800 MG tablet Take 1 tablet by mouth daily as needed for Pain (right hip pain) 90 tablet 0    estrogens conjugated (PREMARIN) 0.625 MG/GM

## 2024-10-22 ENCOUNTER — CLINICAL DOCUMENTATION (OUTPATIENT)
Dept: FAMILY MEDICINE CLINIC | Age: 69
End: 2024-10-22

## 2024-10-22 DIAGNOSIS — M85.80 OSTEOPENIA, UNSPECIFIED LOCATION: ICD-10-CM

## 2024-10-22 DIAGNOSIS — Z87.39 HISTORY OF OSTEOPOROSIS: Primary | ICD-10-CM

## 2024-10-22 NOTE — PROGRESS NOTES
Reordered Prolia. Recent electrolytes WNL. Patient on vitamin D supplementation, needs vitamin D redrawn prior to Prolia.    Lesli Cheung DO  Family Medicine Resident, PGY-3  [facility]  10/22/2024 12:26 PM

## 2024-10-23 ENCOUNTER — LAB (OUTPATIENT)
Dept: FAMILY MEDICINE CLINIC | Age: 69
End: 2024-10-23

## 2024-10-23 DIAGNOSIS — E55.9 VITAMIN D DEFICIENCY: ICD-10-CM

## 2024-10-23 LAB — VITAMIN D 25-HYDROXY: 31.3 NG/ML (ref 30–100)

## 2024-10-24 ENCOUNTER — TELEPHONE (OUTPATIENT)
Dept: FAMILY MEDICINE CLINIC | Age: 69
End: 2024-10-24

## 2024-10-24 DIAGNOSIS — Z87.39 HISTORY OF OSTEOPOROSIS: ICD-10-CM

## 2024-10-24 DIAGNOSIS — M85.80 OSTEOPENIA, UNSPECIFIED LOCATION: ICD-10-CM

## 2024-10-24 RX ORDER — DENOSUMAB 60 MG/ML
60 INJECTION SUBCUTANEOUS ONCE
Qty: 1 ML | Refills: 0 | Status: CANCELLED | OUTPATIENT
Start: 2024-10-24 | End: 2024-10-24

## 2024-10-24 NOTE — TELEPHONE ENCOUNTER
Last Appointment   9/13/2024  Next Appointment  11/7/2024    Infusion center called stating that the Prolia script needs to be printed out, signed and faxed to [phone]

## 2024-10-24 NOTE — PROGRESS NOTES
Called to dr. Cheung's office and spoke with Kylee asked if they can print off the Prolia script and have doctor sign and fax over she said she will.

## 2024-10-25 ENCOUNTER — HOSPITAL ENCOUNTER (OUTPATIENT)
Dept: INFUSION THERAPY | Age: 69
Setting detail: INFUSION SERIES
Discharge: HOME | End: 2024-10-25
Payer: MEDICARE

## 2024-10-25 VITALS
SYSTOLIC BLOOD PRESSURE: 116 MMHG | RESPIRATION RATE: 16 BRPM | OXYGEN SATURATION: 96 % | DIASTOLIC BLOOD PRESSURE: 80 MMHG | HEIGHT: 61 IN | HEART RATE: 102 BPM | TEMPERATURE: 97.2 F | BODY MASS INDEX: 25.11 KG/M2 | WEIGHT: 133 LBS

## 2024-10-25 DIAGNOSIS — M85.80 OSTEOPENIA, UNSPECIFIED LOCATION: ICD-10-CM

## 2024-10-25 DIAGNOSIS — Z87.39 HISTORY OF OSTEOPOROSIS: Primary | ICD-10-CM

## 2024-10-25 PROCEDURE — 6360000002 HC RX W HCPCS

## 2024-10-25 PROCEDURE — 96372 THER/PROPH/DIAG INJ SC/IM: CPT

## 2024-10-25 RX ADMIN — DENOSUMAB 60 MG: 60 INJECTION SUBCUTANEOUS at 11:35

## 2024-10-25 NOTE — PROGRESS NOTES
Tolerated injection well.  Reviewed therapy plan, offered education material and/or discharge material, reviewed medication information and signs and symptoms  and educated on possible side effects, verbalizes good knowledge of current plan patient verbalizes understanding, and has no signs or symptoms to report at this time.   Patient discharged. Patient alert and oriented x3.   No distress noted.   Vital signs stable.   Patient denies any new or worsening pain.  Patient denies any needs.  All questions answered.  Next appointment scheduled. Declines  copy of AVS. Instructed on lab draw for next injection.

## 2024-10-29 ENCOUNTER — OFFICE VISIT (OUTPATIENT)
Dept: ONCOLOGY | Age: 69
End: 2024-10-29
Payer: MEDICARE

## 2024-10-29 ENCOUNTER — HOSPITAL ENCOUNTER (OUTPATIENT)
Dept: INFUSION THERAPY | Age: 69
Discharge: HOME | End: 2024-10-29

## 2024-10-29 VITALS
BODY MASS INDEX: 24.92 KG/M2 | SYSTOLIC BLOOD PRESSURE: 137 MMHG | TEMPERATURE: 97.8 F | HEART RATE: 95 BPM | OXYGEN SATURATION: 99 % | WEIGHT: 132 LBS | DIASTOLIC BLOOD PRESSURE: 65 MMHG | HEIGHT: 61 IN

## 2024-10-29 DIAGNOSIS — Z85.3 PERSONAL HISTORY OF MALIGNANT NEOPLASM OF BREAST: Primary | ICD-10-CM

## 2024-10-29 PROCEDURE — 3017F COLORECTAL CA SCREEN DOC REV: CPT | Performed by: STUDENT IN AN ORGANIZED HEALTH CARE EDUCATION/TRAINING PROGRAM

## 2024-10-29 PROCEDURE — 1123F ACP DISCUSS/DSCN MKR DOCD: CPT | Performed by: STUDENT IN AN ORGANIZED HEALTH CARE EDUCATION/TRAINING PROGRAM

## 2024-10-29 PROCEDURE — G8420 CALC BMI NORM PARAMETERS: HCPCS | Performed by: STUDENT IN AN ORGANIZED HEALTH CARE EDUCATION/TRAINING PROGRAM

## 2024-10-29 PROCEDURE — 99213 OFFICE O/P EST LOW 20 MIN: CPT | Performed by: STUDENT IN AN ORGANIZED HEALTH CARE EDUCATION/TRAINING PROGRAM

## 2024-10-29 PROCEDURE — 99212 OFFICE O/P EST SF 10 MIN: CPT

## 2024-10-29 PROCEDURE — G8482 FLU IMMUNIZE ORDER/ADMIN: HCPCS | Performed by: STUDENT IN AN ORGANIZED HEALTH CARE EDUCATION/TRAINING PROGRAM

## 2024-10-29 PROCEDURE — 3078F DIAST BP <80 MM HG: CPT | Performed by: STUDENT IN AN ORGANIZED HEALTH CARE EDUCATION/TRAINING PROGRAM

## 2024-10-29 PROCEDURE — G8399 PT W/DXA RESULTS DOCUMENT: HCPCS | Performed by: STUDENT IN AN ORGANIZED HEALTH CARE EDUCATION/TRAINING PROGRAM

## 2024-10-29 PROCEDURE — 3075F SYST BP GE 130 - 139MM HG: CPT | Performed by: STUDENT IN AN ORGANIZED HEALTH CARE EDUCATION/TRAINING PROGRAM

## 2024-10-29 PROCEDURE — G8427 DOCREV CUR MEDS BY ELIG CLIN: HCPCS | Performed by: STUDENT IN AN ORGANIZED HEALTH CARE EDUCATION/TRAINING PROGRAM

## 2024-10-29 PROCEDURE — 1090F PRES/ABSN URINE INCON ASSESS: CPT | Performed by: STUDENT IN AN ORGANIZED HEALTH CARE EDUCATION/TRAINING PROGRAM

## 2024-10-29 PROCEDURE — 1159F MED LIST DOCD IN RCRD: CPT | Performed by: STUDENT IN AN ORGANIZED HEALTH CARE EDUCATION/TRAINING PROGRAM

## 2024-10-29 PROCEDURE — 1036F TOBACCO NON-USER: CPT | Performed by: STUDENT IN AN ORGANIZED HEALTH CARE EDUCATION/TRAINING PROGRAM

## 2024-10-29 NOTE — PROGRESS NOTES
Patient provided with discharge instructions.  All questions answered.  Patient understands follow up plan of care.     Patient refused printed AVS.   Pt verbalized understanding of follow up plan of c

## 2024-10-29 NOTE — PROGRESS NOTES
[facility] PHYSICIANS [facility] CARE [facility] MEDICAL ONCOLOGY  [address] [address]  Dept: [phone]  Loc: [phone]    Ileana Amos MD   ·   MD Yanely Lopez APRN  ·  JUAN ANTONIO Chase    Attending Progress Note      Reason for Visit:   Left breast invasive carcinoma, and right breast DCIS.    Referring Physician:  Bernadette Cruz, APRN - CNP    PCP:  Lesli Cheung DO        SUBJECTIVE:  Pt transitioning care with me. She denies of new issues, pain, n/v.       ONCOLOGIC HISTORY:    Doreen Nunez is a 68 y.o. year old woman with a past medical history of essential hypertension, GERD, history of suicide attempt in her 20s, bipolar disorder, hyperlipidemia, COPD with tobacco use disorder, chronic back pain, and history of bilateral breast cancer.  She also reports a history of \"vaginal\" cancer in /.  Review of records indicate a Pap smear on 09/15/2015 revealed low-grade squamous intraepithelial lesion/mild dysplasia with HPV cytopathic effect.     She presents today to establish care for her history of bilateral breast cancer which was diagnosed while living in [place] in 2019 post bilateral mastectomies.  She reports she underwent adjuvant chemotherapy for approximately 6 months.  Was recommended radiation therapy in the postmastectomy setting to the left breast but she declined.  She was on endocrine therapy from  to May 2023 at which time she decided to stop it as she reports she was advised it could interfere with her hormones/premarin topical.     Risks for breast cancer include age, gender, sister with breast cancer at age 50.  Sister with history of melanoma and metastatic disease to the brain at age 59, and a nephew with gastric cancer/GIST at age 54.  Menarche age 12-13.  Menopause age 50.  .  First live birth age 18.  She did not breast-feed.     She reports today she interested in delayed breast

## 2024-11-07 ENCOUNTER — OFFICE VISIT (OUTPATIENT)
Dept: FAMILY MEDICINE CLINIC | Age: 69
End: 2024-11-07

## 2024-11-07 VITALS
HEART RATE: 92 BPM | HEIGHT: 61 IN | BODY MASS INDEX: 25.37 KG/M2 | TEMPERATURE: 98.3 F | RESPIRATION RATE: 16 BRPM | WEIGHT: 134.4 LBS | DIASTOLIC BLOOD PRESSURE: 78 MMHG | SYSTOLIC BLOOD PRESSURE: 134 MMHG | OXYGEN SATURATION: 98 %

## 2024-11-07 DIAGNOSIS — Z73.0 BURNOUT OF CAREGIVER: ICD-10-CM

## 2024-11-07 DIAGNOSIS — M81.0 AGE-RELATED OSTEOPOROSIS WITHOUT CURRENT PATHOLOGICAL FRACTURE: ICD-10-CM

## 2024-11-07 DIAGNOSIS — Z72.0 TOBACCO ABUSE: Primary | ICD-10-CM

## 2024-11-07 DIAGNOSIS — Z97.2 FULL DENTURES: ICD-10-CM

## 2024-11-07 DIAGNOSIS — K08.109 FULL DENTURES: ICD-10-CM

## 2024-11-07 DIAGNOSIS — R25.1 TREMOR: ICD-10-CM

## 2024-11-07 DIAGNOSIS — Z91.51 HISTORY OF SUICIDE ATTEMPT: ICD-10-CM

## 2024-11-07 DIAGNOSIS — F31.9 BIPOLAR DEPRESSION (HCC): ICD-10-CM

## 2024-11-07 PROBLEM — L03.032 PARONYCHIA OF TOE OF LEFT FOOT: Status: RESOLVED | Noted: 2024-01-17 | Resolved: 2024-11-07

## 2024-11-07 PROBLEM — B35.1 ONYCHOMYCOSIS: Status: RESOLVED | Noted: 2024-01-17 | Resolved: 2024-11-07

## 2024-11-07 PROBLEM — L03.031 PARONYCHIA OF TOE OF RIGHT FOOT: Status: RESOLVED | Noted: 2024-01-17 | Resolved: 2024-11-07

## 2024-11-07 PROBLEM — M77.52: Status: RESOLVED | Noted: 2024-01-17 | Resolved: 2024-11-07

## 2024-11-07 PROBLEM — L60.0 INGROWN TOENAIL: Status: RESOLVED | Noted: 2024-01-17 | Resolved: 2024-11-07

## 2024-11-07 PROBLEM — M79.674 PAIN OF TOE OF RIGHT FOOT: Status: RESOLVED | Noted: 2024-01-17 | Resolved: 2024-11-07

## 2024-11-07 PROBLEM — M79.675 PAIN OF TOE OF LEFT FOOT: Status: RESOLVED | Noted: 2024-01-17 | Resolved: 2024-11-07

## 2024-11-07 RX ORDER — CHOLECALCIFEROL (VITAMIN D3) 25 MCG
1 TABLET ORAL DAILY
COMMUNITY
Start: 2024-10-02

## 2024-11-07 NOTE — PROGRESS NOTES
[facility]  Precepting Note    Subjective:  Follow up   Had SE from Chantix, stopped  Using patches, cutting down   Hx of bipolar disorder, remote hx of manic episode and suicide attempts  Thinks she has TD from prior anti psychotic use   On Prolia, may be need dental surgery     ROS otherwise negative     Past medical, surgical, family and social history were reviewed, non-contributory, and unchanged unless otherwise stated.    Objective:    /78   Pulse 92   Temp 98.3 °F (36.8 °C) (Temporal)   Resp 16   Ht 1.549 m (5' 1\")   Wt 61 kg (134 lb 6.4 oz)   SpO2 98%   BMI 25.39 kg/m²     Exam is as noted by resident with the following changes, additions or corrections:      Assessment/Plan:  TUD- cont patch   Essential tremor- monitor, cut down caffeine, smoking. Consider BB  Osteopososis- hold Prolia due to upcoming dental surgery, risk of osteonecrosis     Attending Physician Statement  I have reviewed the chart, including any radiology or labs. I have discussed the case, including pertinent history and exam findings with the resident.  I agree with the assessment, plan and orders as documented by the resident.  Please refer to the resident note for additional information.      Electronically signed by Peter Javier MD on 11/7/2024 at 2:56 PM    
teeth thirty years ago from \"bad teeth\", has thinning bone in jaw, meeting with oral surgeon, hasn't set up appt, wants bone graft and then implants, difficult eating with bottom denture plate, weight stable since summer  STOP PROLIA      ROS negative unless otherwise noted    Vitals:   /78   Pulse 92   Temp 98.3 °F (36.8 °C) (Temporal)   Resp 16   Ht 1.549 m (5' 1\")   Wt 61 kg (134 lb 6.4 oz)   SpO2 98%   BMI 25.39 kg/m²   Wt Readings from Last 3 Encounters:   11/07/24 61 kg (134 lb 6.4 oz)   11/05/24 59.9 kg (132 lb)   10/29/24 59.9 kg (132 lb)       PE:  Physical Exam  Constitutional:       General: She is not in acute distress.  HENT:      Head: Normocephalic and atraumatic.      Mouth/Throat:      Comments: Bottom dentures in place, palpable bone deterioration from bottom jaw  Eyes:      Extraocular Movements: Extraocular movements intact.   Cardiovascular:      Rate and Rhythm: Normal rate and regular rhythm.      Heart sounds: No murmur heard.     No friction rub. No gallop.   Pulmonary:      Breath sounds: Normal breath sounds. No wheezing, rhonchi or rales.   Abdominal:      Tenderness: There is no abdominal tenderness. There is no guarding.   Musculoskeletal:      Right lower leg: No edema.      Left lower leg: No edema.   Neurological:      Mental Status: She is alert and oriented to person, place, and time.      Comments: Left upper extremity with intention tremor, coordination intact with finger-nose testing, CN II-XII intact         A / P:     Diagnosis Orders   1. Tobacco abuse        2. Bipolar depression (HCC)        3. Burnout of caregiver        4. History of suicide attempt        5. Age-related osteoporosis without current pathological fracture        6. Tremor        7. Full dentures            Continue using nicotine patches for tobacco cessation, discontinued Chantix due to side effects    Hx bipolar depression, burnout caregiver, hx suicide attempt x2: encouraged patient to

## 2024-11-13 NOTE — PROGRESS NOTES
[facility] PRE-ADMISSION TESTING INSTRUCTIONS    The Preadmission Testing patient is instructed accordingly using the following criteria (check applicable):    ARRIVAL INSTRUCTIONS:  [x] Parking the day of Surgery is located in the Main Entrance lot.  Upon entering the door, make an immediate right to the surgery reception desk    [x] Bring photo ID and insurance card    [x] Bring in a copy of Living will or Durable Power of  papers.    [x] Please be sure to arrange for a responsible adult to provide transportation to and from the hospital    [x] Please arrange for a responsible adult to be with you for the 24 hour period post procedure due to having anesthesia    [x] If you awake am of surgery not feeling well or have temperature >100 please call [phone]    GENERAL INSTRUCTIONS:    [x] No solid foods after midnight, no gum, candy or mints.  May have clear liquids until 4 hours prior to surgery.         [x] You may brush your teeth, do not swallow any toothpaste    [x] Take medications as instructed     [x] Stop herbal supplements and vitamins 5 days prior to procedure    [x] Bring inhalers day of surgery    [x] Shower or bath with soap, lather and rinse well, AM of Surgery, no lotion, powders or creams to surgical site    [x] No tobacco products within 24 hours of surgery     [x] No alcohol or illegal drug use, marijuana included, within 24 hours of surgery.    [x] Jewelry, body piercing's, eyeglasses, contact lenses and dentures are not permitted into surgery (bring cases)      [x] Please do not wear any nail polish, make up or hair products on the day of surgery    [x] You can expect a call the business day prior to procedure to notify you if your arrival time changes    [x] If you receive a survey after surgery we would greatly appreciate your comments    [x] Please notify surgeon if you develop any illness between now and time of surgery (cold, cough, sore throat, fever,

## 2024-11-15 ENCOUNTER — HOSPITAL ENCOUNTER (OUTPATIENT)
Age: 69
Setting detail: OUTPATIENT SURGERY
Discharge: HOME | End: 2024-11-15
Attending: SURGERY | Admitting: SURGERY
Payer: MEDICARE

## 2024-11-15 ENCOUNTER — ANESTHESIA EVENT (OUTPATIENT)
Dept: ENDOSCOPY | Age: 69
End: 2024-11-15
Payer: MEDICARE

## 2024-11-15 ENCOUNTER — ANESTHESIA (OUTPATIENT)
Dept: ENDOSCOPY | Age: 69
End: 2024-11-15
Payer: MEDICARE

## 2024-11-15 VITALS
WEIGHT: 133 LBS | SYSTOLIC BLOOD PRESSURE: 129 MMHG | RESPIRATION RATE: 20 BRPM | HEIGHT: 61 IN | OXYGEN SATURATION: 96 % | HEART RATE: 76 BPM | BODY MASS INDEX: 25.11 KG/M2 | TEMPERATURE: 97.3 F | DIASTOLIC BLOOD PRESSURE: 82 MMHG

## 2024-11-15 DIAGNOSIS — R13.14 PHARYNGOESOPHAGEAL DYSPHAGIA: ICD-10-CM

## 2024-11-15 PROBLEM — K44.9 HIATAL HERNIA: Status: ACTIVE | Noted: 2024-11-15

## 2024-11-15 PROBLEM — K29.90 GASTRITIS AND DUODENITIS: Status: ACTIVE | Noted: 2024-11-15

## 2024-11-15 PROCEDURE — 6360000002 HC RX W HCPCS

## 2024-11-15 PROCEDURE — 7100000011 HC PHASE II RECOVERY - ADDTL 15 MIN: Performed by: SURGERY

## 2024-11-15 PROCEDURE — 3609015300 HC ESOPHAGEAL DILATION MALONEY: Performed by: SURGERY

## 2024-11-15 PROCEDURE — 7100000010 HC PHASE II RECOVERY - FIRST 15 MIN: Performed by: SURGERY

## 2024-11-15 PROCEDURE — 3700000000 HC ANESTHESIA ATTENDED CARE: Performed by: SURGERY

## 2024-11-15 PROCEDURE — 2709999900 HC NON-CHARGEABLE SUPPLY: Performed by: SURGERY

## 2024-11-15 PROCEDURE — 3700000001 HC ADD 15 MINUTES (ANESTHESIA): Performed by: SURGERY

## 2024-11-15 PROCEDURE — 2580000003 HC RX 258

## 2024-11-15 PROCEDURE — 43450 DILATE ESOPHAGUS 1/MULT PASS: CPT | Performed by: SURGERY

## 2024-11-15 PROCEDURE — 43239 EGD BIOPSY SINGLE/MULTIPLE: CPT | Performed by: SURGERY

## 2024-11-15 PROCEDURE — 88305 TISSUE EXAM BY PATHOLOGIST: CPT

## 2024-11-15 PROCEDURE — 2500000003 HC RX 250 WO HCPCS

## 2024-11-15 RX ORDER — LIDOCAINE HYDROCHLORIDE 20 MG/ML
INJECTION, SOLUTION EPIDURAL; INFILTRATION; INTRACAUDAL; PERINEURAL
Status: DISCONTINUED | OUTPATIENT
Start: 2024-11-15 | End: 2024-11-15 | Stop reason: SDUPTHER

## 2024-11-15 RX ORDER — SODIUM CHLORIDE 9 MG/ML
INJECTION, SOLUTION INTRAVENOUS
Status: DISCONTINUED | OUTPATIENT
Start: 2024-11-15 | End: 2024-11-15 | Stop reason: SDUPTHER

## 2024-11-15 RX ORDER — PROPOFOL 10 MG/ML
INJECTION, EMULSION INTRAVENOUS
Status: DISCONTINUED | OUTPATIENT
Start: 2024-11-15 | End: 2024-11-15 | Stop reason: SDUPTHER

## 2024-11-15 RX ADMIN — LIDOCAINE HYDROCHLORIDE 60 MG: 20 INJECTION, SOLUTION EPIDURAL; INFILTRATION; INTRACAUDAL; PERINEURAL at 12:50

## 2024-11-15 RX ADMIN — SODIUM CHLORIDE: 9 INJECTION, SOLUTION INTRAVENOUS at 12:38

## 2024-11-15 RX ADMIN — PROPOFOL 150 MG: 10 INJECTION, EMULSION INTRAVENOUS at 12:50

## 2024-11-15 ASSESSMENT — LIFESTYLE VARIABLES: SMOKING_STATUS: 1

## 2024-11-15 ASSESSMENT — PAIN SCALES - GENERAL: PAINLEVEL_OUTOF10: 0

## 2024-11-15 ASSESSMENT — PAIN - FUNCTIONAL ASSESSMENT
PAIN_FUNCTIONAL_ASSESSMENT: 0-10
PAIN_FUNCTIONAL_ASSESSMENT: 0-10

## 2024-11-15 NOTE — ANESTHESIA PRE PROCEDURE
Department of Anesthesiology  Preprocedure Note       Name:  Doreen Nunez   Age:  69 y.o.  :  1955                                          MRN:  [id number]         Date:  11/15/2024      Surgeon: Surgeon(s):  Juan Pablo Langston MD    Procedure: Procedure(s):  ESOPHAGOGASTRODUODENOSCOPY DILATION    Medications prior to admission:   Prior to Admission medications    Medication Sig Start Date End Date Taking? Authorizing Provider   fluticasone (FLONASE) 50 MCG/ACT nasal spray 2 sprays by Each Nostril route daily Shake liquid  Patient taking differently: 2 sprays by Each Nostril route daily as needed Shake liquid 10/15/24  Yes Nic Blank APRN - CNP   pantoprazole (PROTONIX) 40 MG tablet Take 1 tablet by mouth daily  Patient taking differently: Take 1 tablet by mouth daily as needed 10/14/24  Yes Lesli Cheung DO   ibuprofen (ADVIL;MOTRIN) 800 MG tablet Take 1 tablet by mouth daily as needed for Pain (right hip pain) 24  Yes Blanquita Cade MD   rosuvastatin (CRESTOR) 20 MG tablet Take 1 tablet by mouth nightly 8/15/24  Yes Lesli Cheung DO   magnesium 200 MG TABS tablet Take by mouth   Yes ProviderMontserrat MD   [facility] Natural Products (BRAINSTRONG MEMORY SUPPORT PO)    Yes ProviderMontserrat MD   vitamin D3 (CHOLECALCIFEROL) 25 MCG (1000 UT) TABS tablet Take 1 tablet by mouth daily 10/2/24   ProviderMontserrat MD   nicotine (NICODERM CQ) 14 MG/24HR Place 1 patch onto the skin daily for 28 days 24  Lesli Cheung DO   albuterol sulfate HFA (PROVENTIL;VENTOLIN;PROAIR) 108 (90 Base) MCG/ACT inhaler Inhale 2 puffs into the lungs every 6 hours as needed for Wheezing 24   Lesli Cheung DO   mometasone (ELOCON) 0.1 % cream Apply topically daily. 16   Griselda Jc MD   acetaminophen (TYLENOL) 325 MG tablet Take 2 tablets by mouth as needed for Pain    ProviderMontserrat MD   polyethylene glycol (GLYCOLAX) powder Take 17 g by mouth as needed    Provider,

## 2024-11-15 NOTE — PROGRESS NOTES
PT RECEIVED IN PACU 2 POST PROCEDURE FAMILY AT BEDSIDE , DR ACOSTA HERE AND UPDATED PT ON PROCEDURE AND FOLLOW UP AT THIS TIME  1320 PPT TAKING CLEAR LIQUIDS TOLERATING WELL , NO CREPITDNESS POST DILATION  1330 DISCHARGE INSTRUCTIONS GIVEN TO PT AND HER FAMILY BOTH VERBALIZED UNDERSTANDING OF INSTRUCTIONS AND FOLLOW UP, PAMPHLETS GIVEN

## 2024-11-15 NOTE — H&P
(PROLIA) 60 MG/ML SOSY SC injection Inject 1 mL into the skin once for 1 dose 4/5/24 7/30/24 Yes Lesli Cheung DO   rosuvastatin (CRESTOR) 20 MG tablet Take 1 tablet by mouth nightly 1/10/24   Yes Lesli Cheung DO   estrogens conjugated (PREMARIN) 0.625 MG/GM CREA vaginal cream Twice weekly 9/1/23   Yes Ailyn Ferrari MD   mometasone (ELOCON) 0.1 % cream Apply topically daily. 8/23/16   Yes Griselda Jc MD   acetaminophen (TYLENOL) 325 MG tablet Take 2 tablets by mouth as needed for Pain     Yes Provider, MD Montserrat   polyethylene glycol (GLYCOLAX) powder Take 17 g by mouth as needed     Yes Provider, MD Montserrat   [facility] ([facility])         Provider, MD Montserrat            Allergies         Allergies   Allergen Reactions    Abilify [Aripiprazole] Swelling       Tongue    Aripiprazole      Depakote [Divalproex Sodium]      Doxycycline      Elemental Sulfur         Ear drops    Erythromycin      Escitalopram Oxalate      Geodon [Ziprasidone Hcl]      Hydrocodone Itching    Lamictal [Lamotrigine]      Prozac [Fluoxetine Hcl]      Quetiapine Fumarate      Risperdal      Wellbutrin [Bupropion Hcl]      Ziprasidone Hcl      Nicoderm [Nicotine] Rash       Rash with blisters nicotine patch    Senna Rash       With blisters            Social History   Social History            Socioeconomic History    Marital status:        Spouse name: None    Number of children: None    Years of education: None    Highest education level: None   Tobacco Use    Smoking status: Every Day       Current packs/day: 0.50       Average packs/day: 0.5 packs/day for 35.0 years (17.5 ttl pk-yrs)       Types: Cigarettes    Smokeless tobacco: Never   Vaping Use    Vaping Use: Never used   Substance and Sexual Activity    Alcohol use: No       Alcohol/week: 0.0 standard drinks of alcohol    Drug use: No    Sexual activity: Not Currently      Social Determinants of  be present. Please excuse any transcriptional grammatical or spelling errors that may have escaped my editorial review.     CC: Lesli Cheung, DO

## 2024-11-18 NOTE — ANESTHESIA POSTPROCEDURE EVALUATION
Department of Anesthesiology  Postprocedure Note    Patient: Doreen Nunez  MRN: [id number]  YOB: 1955  Date of evaluation: 11/18/2024    Procedure Summary       Date: 11/15/24 Room / Location: [place] [address] / [facility]    Anesthesia Start: 1238 Anesthesia Stop: 1300    Procedure: ESOPHAGOGASTRODUODENOSCOPY DILATION Diagnosis:       Pharyngoesophageal dysphagia      (Pharyngoesophageal dysphagia [R13.14])    Surgeons: Juan Pablo Langston MD Responsible Provider: Mario Hernandez MD    Anesthesia Type: MAC ASA Status: 3            Anesthesia Type: No value filed.    Sofy Phase I: Sofy Score: 10    Sofy Phase II: Sofy Score: 10    Anesthesia Post Evaluation    Patient location during evaluation: PACU  Patient participation: complete - patient participated  Level of consciousness: awake and alert  Airway patency: patent  Nausea & Vomiting: no nausea and no vomiting  Cardiovascular status: hemodynamically stable  Respiratory status: acceptable  Hydration status: euvolemic  There was medical reason for not using a multimodal analgesia pain management approach.Pain management: adequate    No notable events documented.

## 2024-11-20 PROBLEM — K21.00 REFLUX ESOPHAGITIS: Status: ACTIVE | Noted: 2024-11-20

## 2024-11-20 LAB — SURGICAL PATHOLOGY REPORT: NORMAL

## 2024-11-27 DIAGNOSIS — N95.2 ATROPHIC VAGINITIS: ICD-10-CM

## 2024-11-29 RX ORDER — CONJUGATED ESTROGENS 0.62 MG/G
CREAM VAGINAL
Qty: 30 G | Refills: 0 | OUTPATIENT
Start: 2024-11-29

## 2024-12-03 ENCOUNTER — OFFICE VISIT (OUTPATIENT)
Dept: SURGERY | Age: 69
End: 2024-12-03
Payer: MEDICARE

## 2024-12-03 VITALS
HEART RATE: 87 BPM | WEIGHT: 133.4 LBS | SYSTOLIC BLOOD PRESSURE: 120 MMHG | HEIGHT: 61 IN | RESPIRATION RATE: 18 BRPM | BODY MASS INDEX: 25.19 KG/M2 | DIASTOLIC BLOOD PRESSURE: 76 MMHG | OXYGEN SATURATION: 97 %

## 2024-12-03 DIAGNOSIS — R13.14 PHARYNGOESOPHAGEAL DYSPHAGIA: Primary | ICD-10-CM

## 2024-12-03 DIAGNOSIS — K21.9 GASTROESOPHAGEAL REFLUX DISEASE, UNSPECIFIED WHETHER ESOPHAGITIS PRESENT: ICD-10-CM

## 2024-12-03 DIAGNOSIS — Z12.11 ENCOUNTER FOR SCREENING COLONOSCOPY: ICD-10-CM

## 2024-12-03 DIAGNOSIS — K29.90 GASTRODUODENITIS: ICD-10-CM

## 2024-12-03 PROCEDURE — G8482 FLU IMMUNIZE ORDER/ADMIN: HCPCS | Performed by: SURGERY

## 2024-12-03 PROCEDURE — 1159F MED LIST DOCD IN RCRD: CPT | Performed by: SURGERY

## 2024-12-03 PROCEDURE — 1090F PRES/ABSN URINE INCON ASSESS: CPT | Performed by: SURGERY

## 2024-12-03 PROCEDURE — 99214 OFFICE O/P EST MOD 30 MIN: CPT | Performed by: SURGERY

## 2024-12-03 PROCEDURE — G8399 PT W/DXA RESULTS DOCUMENT: HCPCS | Performed by: SURGERY

## 2024-12-03 PROCEDURE — 3017F COLORECTAL CA SCREEN DOC REV: CPT | Performed by: SURGERY

## 2024-12-03 PROCEDURE — G8419 CALC BMI OUT NRM PARAM NOF/U: HCPCS | Performed by: SURGERY

## 2024-12-03 PROCEDURE — 1123F ACP DISCUSS/DSCN MKR DOCD: CPT | Performed by: SURGERY

## 2024-12-03 PROCEDURE — G8427 DOCREV CUR MEDS BY ELIG CLIN: HCPCS | Performed by: SURGERY

## 2024-12-03 PROCEDURE — 4004F PT TOBACCO SCREEN RCVD TLK: CPT | Performed by: SURGERY

## 2024-12-03 NOTE — PROGRESS NOTES
[facility] Note    Assessment & Plan  1. GERD.  She is currently on Protonix, which controls her reflux well. She is advised to continue with Protonix and monitor her diet, particularly avoiding caffeine, spicy foods, and sauces. It was discussed that the long-term use of Protonix can lead to osteoporosis, bone density issues, kidney problems, and dementia, although the risk is very low, and she does not want to switch medications.    2. Pharyngoesophageal dysphagia.  The patient reports improvement in swallowing after esophageal dilation. She is advised to take small bites and chew well, especially considering her dentures.    3. Osteoporosis.  The patient has a history of osteoporosis, which has improved to osteopenia. She attributes this improvement to increased water intake, dietary changes, and weight loss. Continued monitoring and maintaining a healthy lifestyle are recommended.    4. Health Maintenance.  The patient is due for a colonoscopy next year. She will be contacted to schedule the procedure.      Return for Colonoscopy.    Doreen was seen today for follow-up.    Diagnoses and all orders for this visit:    Pharyngoesophageal dysphagia    Gastroesophageal reflux disease, unspecified whether esophagitis present    Gastroduodenitis    Encounter for screening colonoscopy          Chief Complaint   Patient presents with    Follow-up     EGD follow up, 11-15-24       PCP: Lesli Cheung DO  CC: No ref. provider found     Doreen Nunez is a 69 y.o. female .    History of Present Illness  The patient presents for a follow-up of EGD for diagnoses of GERD and pharyngoesophageal dysphagia.    The patient underwent an EGD which revealed a 1 cm hiatal hernia, gastritis, and duodenitis. Pathology was negative for H. pylori, and duodenal biopsies showed no signs of sprue. Esophageal biopsies indicated reflux esophagitis. She reports that the esophageal dilation has improved her

## 2024-12-10 ENCOUNTER — PREP FOR PROCEDURE (OUTPATIENT)
Dept: SURGERY | Age: 69
End: 2024-12-10

## 2024-12-10 DIAGNOSIS — Z12.11 COLON CANCER SCREENING: ICD-10-CM

## 2025-01-08 ENCOUNTER — TELEPHONE (OUTPATIENT)
Dept: SURGERY | Age: 70
End: 2025-01-08

## 2025-01-08 NOTE — TELEPHONE ENCOUNTER
Doreen Nunez is scheduled for Colonoscopy with Dr Langston on 05/06/2025 at [facility]. Patient needs to be NPO after midnight the night before procedure. All surgery instructions were explained to the patient and a surgery letter was also mailed out. MA informed patient that PAT will also be calling to review pre-op instructions and medications. Patient verbalized understanding. Electronically signed by Melina Orellana MA on 1/8/2025 at 3:38 PM

## 2025-01-09 PROBLEM — Z12.11 COLON CANCER SCREENING: Status: RESOLVED | Noted: 2024-12-10 | Resolved: 2025-01-09

## 2025-01-16 ENCOUNTER — TELEPHONE (OUTPATIENT)
Dept: ENT CLINIC | Age: 70
End: 2025-01-16

## 2025-01-16 NOTE — TELEPHONE ENCOUNTER
Name of Medication(s) Requested:  Requested Prescriptions     Pending Prescriptions Disp Refills    vitamin D3 (CHOLECALCIFEROL) 25 MCG (1000 UT) TABS tablet 30 tablet 2     Sig: Take 1 tablet by mouth daily       Medication is on current medication list Yes    Dosage and directions were verified? Yes    Quantity verified: 90 day supply     Pharmacy Verified?  Yes    Last Appointment:  11/7/2024    Future appts:  Future Appointments   Date Time Provider Department Center   1/23/2025  1:30 PM [facility]   3/13/2025  2:00 PM María Elena Davenport DO [facility]   4/15/2025  2:45 PM Nic Blank, APRN - CNP [place] [facility]   4/25/2025  1:00 PM [facility]  2 [facility]   4/29/2025  1:30 PM Juan Orona MD [facility] MED ONC [facility]   4/29/2025  1:45 PM [facility] MED ONC FAST TRACK 3 [facility] Med Onc [facility]        (If no appt send self scheduling link. .REFILLAPPT)  Scheduling request sent?     [] Yes  [x] No    Does patient need updated?  [x] Yes  [] No

## 2025-01-16 NOTE — TELEPHONE ENCOUNTER
Pt called office and left a voicemail. She is requesting for her pharmacy to be changed to the [facility] in [facility]  instead of [place].     Electronically signed by Alba Hernandes on 1/16/2025 at 7:30 AM

## 2025-01-17 RX ORDER — CHOLECALCIFEROL (VITAMIN D3) 25 MCG
1 TABLET ORAL DAILY
Qty: 30 TABLET | Refills: 2 | Status: SHIPPED | OUTPATIENT
Start: 2025-01-17

## 2025-01-20 SDOH — ECONOMIC STABILITY: FOOD INSECURITY: WITHIN THE PAST 12 MONTHS, YOU WORRIED THAT YOUR FOOD WOULD RUN OUT BEFORE YOU GOT MONEY TO BUY MORE.: NEVER TRUE

## 2025-01-20 SDOH — ECONOMIC STABILITY: FOOD INSECURITY: WITHIN THE PAST 12 MONTHS, THE FOOD YOU BOUGHT JUST DIDN'T LAST AND YOU DIDN'T HAVE MONEY TO GET MORE.: NEVER TRUE

## 2025-01-20 SDOH — ECONOMIC STABILITY: TRANSPORTATION INSECURITY
IN THE PAST 12 MONTHS, HAS THE LACK OF TRANSPORTATION KEPT YOU FROM MEDICAL APPOINTMENTS OR FROM GETTING MEDICATIONS?: NO

## 2025-01-20 ASSESSMENT — PATIENT HEALTH QUESTIONNAIRE - PHQ9
10. IF YOU CHECKED OFF ANY PROBLEMS, HOW DIFFICULT HAVE THESE PROBLEMS MADE IT FOR YOU TO DO YOUR WORK, TAKE CARE OF THINGS AT HOME, OR GET ALONG WITH OTHER PEOPLE: SOMEWHAT DIFFICULT
2. FEELING DOWN, DEPRESSED OR HOPELESS: SEVERAL DAYS
8. MOVING OR SPEAKING SO SLOWLY THAT OTHER PEOPLE COULD HAVE NOTICED. OR THE OPPOSITE, BEING SO FIGETY OR RESTLESS THAT YOU HAVE BEEN MOVING AROUND A LOT MORE THAN USUAL: NOT AT ALL
4. FEELING TIRED OR HAVING LITTLE ENERGY: NOT AT ALL
3. TROUBLE FALLING OR STAYING ASLEEP: NOT AT ALL
9. THOUGHTS THAT YOU WOULD BE BETTER OFF DEAD, OR OF HURTING YOURSELF: NOT AT ALL
6. FEELING BAD ABOUT YOURSELF - OR THAT YOU ARE A FAILURE OR HAVE LET YOURSELF OR YOUR FAMILY DOWN: NOT AT ALL
SUM OF ALL RESPONSES TO PHQ9 QUESTIONS 1 & 2: 2
1. LITTLE INTEREST OR PLEASURE IN DOING THINGS: SEVERAL DAYS
SUM OF ALL RESPONSES TO PHQ QUESTIONS 1-9: 2
7. TROUBLE CONCENTRATING ON THINGS, SUCH AS READING THE NEWSPAPER OR WATCHING TELEVISION: NOT AT ALL
5. POOR APPETITE OR OVEREATING: NOT AT ALL
SUM OF ALL RESPONSES TO PHQ QUESTIONS 1-9: 2

## 2025-01-23 ASSESSMENT — PATIENT HEALTH QUESTIONNAIRE - PHQ9
SUM OF ALL RESPONSES TO PHQ QUESTIONS 1-9: 2
3. TROUBLE FALLING OR STAYING ASLEEP: NOT AT ALL
4. FEELING TIRED OR HAVING LITTLE ENERGY: NOT AT ALL
6. FEELING BAD ABOUT YOURSELF - OR THAT YOU ARE A FAILURE OR HAVE LET YOURSELF OR YOUR FAMILY DOWN: NOT AT ALL
8. MOVING OR SPEAKING SO SLOWLY THAT OTHER PEOPLE COULD HAVE NOTICED. OR THE OPPOSITE - BEING SO FIDGETY OR RESTLESS THAT YOU HAVE BEEN MOVING AROUND A LOT MORE THAN USUAL: NOT AT ALL
10. IF YOU CHECKED OFF ANY PROBLEMS, HOW DIFFICULT HAVE THESE PROBLEMS MADE IT FOR YOU TO DO YOUR WORK, TAKE CARE OF THINGS AT HOME, OR GET ALONG WITH OTHER PEOPLE: SOMEWHAT DIFFICULT
2. FEELING DOWN, DEPRESSED OR HOPELESS: SEVERAL DAYS
9. THOUGHTS THAT YOU WOULD BE BETTER OFF DEAD, OR OF HURTING YOURSELF: NOT AT ALL
7. TROUBLE CONCENTRATING ON THINGS, SUCH AS READING THE NEWSPAPER OR WATCHING TELEVISION: NOT AT ALL
1. LITTLE INTEREST OR PLEASURE IN DOING THINGS: SEVERAL DAYS
5. POOR APPETITE OR OVEREATING: NOT AT ALL

## 2025-02-03 ENCOUNTER — TELEPHONE (OUTPATIENT)
Dept: ENT CLINIC | Age: 70
End: 2025-02-03

## 2025-02-03 ENCOUNTER — HOSPITAL ENCOUNTER (EMERGENCY)
Age: 70
Discharge: HOME | End: 2025-02-03
Attending: STUDENT IN AN ORGANIZED HEALTH CARE EDUCATION/TRAINING PROGRAM
Payer: MEDICARE

## 2025-02-03 VITALS
RESPIRATION RATE: 16 BRPM | SYSTOLIC BLOOD PRESSURE: 115 MMHG | TEMPERATURE: 97.6 F | WEIGHT: 133 LBS | HEART RATE: 84 BPM | BODY MASS INDEX: 25.13 KG/M2 | DIASTOLIC BLOOD PRESSURE: 86 MMHG | OXYGEN SATURATION: 96 %

## 2025-02-03 DIAGNOSIS — H92.01 RIGHT EAR PAIN: ICD-10-CM

## 2025-02-03 DIAGNOSIS — H60.501 ACUTE OTITIS EXTERNA OF RIGHT EAR, UNSPECIFIED TYPE: Primary | ICD-10-CM

## 2025-02-03 PROCEDURE — 6370000000 HC RX 637 (ALT 250 FOR IP): Performed by: NURSE PRACTITIONER

## 2025-02-03 PROCEDURE — 99283 EMERGENCY DEPT VISIT LOW MDM: CPT

## 2025-02-03 RX ADMIN — AMOXICILLIN AND CLAVULANATE POTASSIUM 1 TABLET: 875; 125 TABLET, FILM COATED ORAL at 11:03

## 2025-02-03 SDOH — ECONOMIC STABILITY: INCOME INSECURITY: IN THE LAST 12 MONTHS, WAS THERE A TIME WHEN YOU WERE NOT ABLE TO PAY THE MORTGAGE OR RENT ON TIME?: NO

## 2025-02-03 SDOH — ECONOMIC STABILITY: FOOD INSECURITY: WITHIN THE PAST 12 MONTHS, THE FOOD YOU BOUGHT JUST DIDN'T LAST AND YOU DIDN'T HAVE MONEY TO GET MORE.: PATIENT DECLINED

## 2025-02-03 SDOH — ECONOMIC STABILITY: FOOD INSECURITY: WITHIN THE PAST 12 MONTHS, YOU WORRIED THAT YOUR FOOD WOULD RUN OUT BEFORE YOU GOT MONEY TO BUY MORE.: PATIENT DECLINED

## 2025-02-03 ASSESSMENT — PAIN - FUNCTIONAL ASSESSMENT: PAIN_FUNCTIONAL_ASSESSMENT: 0-10

## 2025-02-03 ASSESSMENT — PAIN DESCRIPTION - LOCATION: LOCATION: EAR

## 2025-02-03 ASSESSMENT — PAIN SCALES - GENERAL: PAINLEVEL_OUTOF10: 5

## 2025-02-03 ASSESSMENT — PAIN DESCRIPTION - DESCRIPTORS: DESCRIPTORS: ACHING;DISCOMFORT

## 2025-02-03 ASSESSMENT — PAIN DESCRIPTION - ORIENTATION: ORIENTATION: RIGHT

## 2025-02-03 NOTE — TELEPHONE ENCOUNTER
Patient seen at [facility] urgent care on 2/3 for an ear infection.  She was given an antibiotic and told to follow up with her ENT.  Patient requesting an afternoon appointment.

## 2025-02-03 NOTE — TELEPHONE ENCOUNTER
Patient scheduled with Abhay 2/11/25. Patient aware to complete 7 day antibiotic given by Urgent Care.    Electronically signed by Renita Gannon MA on 2/3/25 at 11:39 AM EST

## 2025-02-03 NOTE — DISCHARGE INSTRUCTIONS
Continue with Ciprodex as ordered  Start Augmentin 2 x's per day as ordered-finish all antibiotics  Call ENT  today for follow-up appointment  Avoid wearing hearing aide   Avoid getting water in your ear  Tylenol and or Motrin for discomfort   [Alert] : alert [No Acute Distress] : no acute distress [Normocephalic] : normocephalic [Anterior Soso Closed] : anterior fontanelle closed [Red Reflex Bilateral] : red reflex bilateral [Conjunctivae with no discharge] : conjunctivae with no discharge [PERRL] : PERRL [EOMI Bilateral] : EOMI bilateral [Normally Placed Ears] : normally placed ears [Auricles Well Formed] : auricles well formed [Clear Tympanic membranes with present light reflex and bony landmarks] : clear tympanic membranes with present light reflex and bony landmarks [No Discharge] : no discharge [Nares Patent] : nares patent [Palate Intact] : palate intact [Uvula Midline] : uvula midline [Tooth Eruption] : tooth eruption  [Supple, full passive range of motion] : supple, full passive range of motion [No Palpable Masses] : no palpable masses [Symmetric Chest Rise] : symmetric chest rise [Clear to Auscultation Bilaterally] : clear to auscultation bilaterally [Regular Rate and Rhythm] : regular rate and rhythm [S1, S2 present] : S1, S2 present [No Murmurs] : no murmurs [+2 Femoral Pulses] : +2 femoral pulses [Soft] : soft [NonTender] : non tender [Non Distended] : non distended [Normoactive Bowel Sounds] : normoactive bowel sounds [No Hepatomegaly] : no hepatomegaly [No Splenomegaly] : no splenomegaly [Central Urethral Opening] : central urethral opening [Testicles Descended Bilaterally] : testicles descended bilaterally [Patent] : patent [Normally Placed] : normally placed [No Abnormal Lymph Nodes Palpated] : no abnormal lymph nodes palpated [No Clavicular Crepitus] : no clavicular crepitus [Negative Mcghee-Ortalani] : negative Mcghee-Ortalani [Symmetric Buttocks Creases] : symmetric buttocks creases [No Spinal Dimple] : no spinal dimple [NoTuft of Hair] : no tuft of hair [No Rash or Lesions] : no rash or lesions

## 2025-02-03 NOTE — ED PROVIDER NOTES
ED Attending  CC: Yes.       [facility]  Department of Emergency Medicine   ED  Encounter Note  Admit Date/RoomTime: 2/3/2025 10:46 AM  ED Room:     NAME: Doreen Nunez  : 1955  MRN: [id number]     Chief Complaint:  Ear Fullness (Ear pain on right side since Thursday. Hx of artifical ear drum.)    History of Present Illness        Doreen Nunez is a 69 y.o. old female who presenting to the emergency department with complaint of gradual onset right ear pain. Symptoms began 5 days ago and have been gradually improving since that time. Patient denies facial pain.  Her symptoms are relieved by using Ciprodex-but states symptoms have not improved completely. She has recent history of nothing pertinent as it pertains to today's visit. Pt has bilateral hearing aids and report no additional hearing impairment since onset of symptoms. Pt does have history of artifical tympanic ear membranes and does see ENT. Pt states she starts Ciprodex and usually resolves her ear symptoms.     ROS   Pertinent positives and negatives are stated within HPI, all other systems reviewed and are negative.    Past Medical History:  has a past medical history of Arthritis, Bipolar disorder (HCC), Breast cancer (HCC), Cancer of female genital organ (HCC), Capsulitis of toe, left, Chronic back pain, Chronic neck pain, COPD (chronic obstructive pulmonary disease) (HCC), Depression, Dysphagia, Emphysema, Eustachian tube dysfunction, bilateral, GERD (gastroesophageal reflux disease), Headache(784.0), History of tobacco use, [place] (hard of hearing), Hx antineoplastic chemo, Hyperlipidemia, Hypertension, Ingrown toenail, Onychomycosis, Osteoporosis, Pain of toe of left foot, Pain of toe of right foot, Paronychia of toe of left foot, and Paronychia of toe of right foot.    Surgical History:  has a past surgical history that includes Neck surgery; Inner ear surgery; Tonsillectomy; cervical fusion (2003); Echo

## 2025-02-06 ENCOUNTER — OFFICE VISIT (OUTPATIENT)
Dept: FAMILY MEDICINE CLINIC | Age: 70
End: 2025-02-06

## 2025-02-06 VITALS
HEIGHT: 61 IN | DIASTOLIC BLOOD PRESSURE: 82 MMHG | HEART RATE: 84 BPM | WEIGHT: 133 LBS | SYSTOLIC BLOOD PRESSURE: 123 MMHG | RESPIRATION RATE: 16 BRPM | BODY MASS INDEX: 25.11 KG/M2 | TEMPERATURE: 97.3 F | OXYGEN SATURATION: 96 %

## 2025-02-06 DIAGNOSIS — Z72.0 TOBACCO ABUSE: ICD-10-CM

## 2025-02-06 DIAGNOSIS — H92.01 RIGHT EAR PAIN: ICD-10-CM

## 2025-02-06 DIAGNOSIS — R45.4 OUTBURSTS OF ANGER: ICD-10-CM

## 2025-02-06 DIAGNOSIS — H65.191 ACUTE EFFUSION OF RIGHT EAR: Primary | ICD-10-CM

## 2025-02-06 RX ORDER — AZELASTINE 1 MG/ML
1 SPRAY, METERED NASAL 2 TIMES DAILY
Qty: 30 ML | Refills: 0 | Status: SHIPPED | OUTPATIENT
Start: 2025-02-06

## 2025-02-06 NOTE — PROGRESS NOTES
[facility]    CC: Doreen Nunez is a 69 y.o. yo female is here for evaluation evaluation for the following medical concerns: Nicotine Dependence (Smoking again. Been stressful lately), Ear Pain (Right ), and Other (Therapist asked about hecking serotonin levels)      HPI:    R ear pain:  Started on Augmentin 2/3/25 for R AOM, wants ears rechecked, seen in ED 2/3/25, took 1/2 tablet after the first day; uncertain if having fevers, ear pain on right side persists, occasionally uses Flonase nasal spray     Mood:  attending counseling once weekly, caregiver burden taking care of mother which is stressful, moved in with her, goal is to move into her own place in Mar/April; told she was bipolar in the past, feels stable now off of medication, gained weight, was \"miserable on meds,\" hx manic episodes decades ago per patient, denies hallucinations, thoughts of harming herself, reports remote hx of suicide as teen in foster care, 1990's twice with pills; none since, feels stable now  Today she reports she wants to see psychiatrist, counselor \"encouraging her to be on medicines,\" patient feels stable today, has agitated outbursts, reports she tried Abilify, Geodon, Seroquel and thinks she had leg numbness and tongue spasms. Denies that she has inflicted harm on others during these outbursts of anger.     Tobacco use:   Has quit successfully in the past for 6 month time frame, smoking 3 cigarettes per day currently, didn't tolerate Chantix in past due to worsening anxiety and dreams, 14 mcg patches at home but not using currently, not interested in dedicating herself to quitting at this time due to increased stress with mother       ROS negative unless otherwise noted.    Vitals:   /82   Pulse 84   Temp 97.3 °F (36.3 °C) (Temporal)   Resp 16   Ht 1.549 m (5' 0.98\")   Wt 60.3 kg (133 lb)   SpO2 96%   BMI 25.14 kg/m²   Wt Readings from Last 3 Encounters:   02/06/25 60.3 kg (133

## 2025-02-06 NOTE — PROGRESS NOTES
S: 69 y.o. female with   Chief Complaint   Patient presents with    Nicotine Dependence     Smoking again. Been stressful lately    Ear Pain     Right     Other     Therapist asked about hecking serotonin levels       Otalgia - R side - prev seen in ER. Dx with otitis media and tx with augmentin - took 1/2 tab of one pill b/c it was too strong - remains with fullness.     Hx Bipolar disorder - has stressors sees counseling counseling.     O: VS:  height is 1.549 m (5' 0.98\") and weight is 60.3 kg (133 lb). Her temporal temperature is 97.3 °F (36.3 °C). Her blood pressure is 123/82 and her pulse is 84. Her respiration is 16 and oxygen saturation is 96%.   BP Readings from Last 3 Encounters:   02/06/25 123/82   02/03/25 115/86   01/02/25 (!) 160/98     See resident note      Impression/Plan:   1) hx Bipolar - refer   2) Otalgia - serous effusion.   3) Nicotine Dependence - cont to smoke but reduced       Health Maintenance Due   Topic Date Due    Shingles vaccine (2 of 2) 02/04/2022    Annual Wellness Visit (Medicare Advantage)  01/01/2025         Attending Physician Statement  I have discussed the case, including pertinent history and exam findings with the resident. I also have seen the patient and performed key portions of the examination.  I agree with the documented assessment and plan.      Lewis Chavis MD

## 2025-02-09 DIAGNOSIS — Z91.89 CANDIDATE FOR STATIN THERAPY DUE TO RISK OF FUTURE CARDIOVASCULAR EVENT: ICD-10-CM

## 2025-02-10 DIAGNOSIS — K21.9 GASTROESOPHAGEAL REFLUX DISEASE, UNSPECIFIED WHETHER ESOPHAGITIS PRESENT: ICD-10-CM

## 2025-02-10 RX ORDER — PANTOPRAZOLE SODIUM 40 MG/1
40 TABLET, DELAYED RELEASE ORAL DAILY
Qty: 90 TABLET | Refills: 0 | Status: SHIPPED | OUTPATIENT
Start: 2025-02-10

## 2025-02-10 RX ORDER — ROSUVASTATIN CALCIUM 20 MG/1
20 TABLET, COATED ORAL NIGHTLY
Qty: 90 TABLET | Refills: 1 | Status: SHIPPED | OUTPATIENT
Start: 2025-02-10

## 2025-02-10 NOTE — TELEPHONE ENCOUNTER
Name of Medication(s) Requested:  Requested Prescriptions     Pending Prescriptions Disp Refills    pantoprazole (PROTONIX) 40 MG tablet       Sig: Take 1 tablet by mouth daily as needed       Medication is on current medication list Yes    Dosage and directions were verified? Yes    Quantity verified: 90 day supply     Pharmacy Verified?  Yes    Last Appointment:  2/6/2025    Future appts:  Future Appointments   Date Time Provider Department Center   2/11/2025  2:45 PM Nic Blank APRN - CNP [facility]   3/13/2025  2:00 PM María Elena Davenport DO [facility]   4/15/2025  2:45 PM Nic Blank APRN - CNP [facility]   4/25/2025  1:00 PM [facility]   4/29/2025  1:30 PM Juan Orona MD [facility] MED ONC [facility]   4/29/2025  1:45 PM [facility] MED ONC FAST TRACK 3 [facility] Med Onc . [place]        (If no appt send self scheduling link. .REFILLAPPT)  Scheduling request sent?     [] Yes  [x] No    Does patient need updated?  [x] Yes  [] No

## 2025-02-10 NOTE — TELEPHONE ENCOUNTER
Name of Medication(s) Requested:  Requested Prescriptions     Pending Prescriptions Disp Refills    rosuvastatin (CRESTOR) 20 MG tablet 90 tablet 1     Sig: Take 1 tablet by mouth nightly       Medication is on current medication list Yes    Dosage and directions were verified? Yes    Quantity verified: 90 day supply     Pharmacy Verified?  Yes    Last Appointment:  2/6/2025    Future appts:  Future Appointments   Date Time Provider Department Center   2/11/2025  2:45 PM Nic Blank APRN - CNP [facility]   3/13/2025  2:00 PM María Elena Davenport DO [facility]   4/15/2025  2:45 PM Nic Blank APRN - CNP [facility]   4/25/2025  1:00 PM [facility]  2 [facility]   4/29/2025  1:30 PM Juan Orona MD [facility] MED ONC [facility]   4/29/2025  1:45 PM [facility] MED ONC FAST TRACK 3 [facility] Med Onc . [facility]        (If no appt send self scheduling link. .REFILLAPPT)  Scheduling request sent?     [] Yes  [x] No    Does patient need updated?  [x] Yes  [] No

## 2025-02-11 ENCOUNTER — OFFICE VISIT (OUTPATIENT)
Dept: ENT CLINIC | Age: 70
End: 2025-02-11
Payer: MEDICARE

## 2025-02-11 ENCOUNTER — PROCEDURE VISIT (OUTPATIENT)
Dept: AUDIOLOGY | Age: 70
End: 2025-02-11
Payer: MEDICARE

## 2025-02-11 VITALS
TEMPERATURE: 97.2 F | WEIGHT: 133 LBS | OXYGEN SATURATION: 96 % | DIASTOLIC BLOOD PRESSURE: 88 MMHG | HEIGHT: 61 IN | BODY MASS INDEX: 25.11 KG/M2 | SYSTOLIC BLOOD PRESSURE: 130 MMHG | RESPIRATION RATE: 12 BRPM | HEART RATE: 94 BPM

## 2025-02-11 DIAGNOSIS — J30.9 CHRONIC ALLERGIC RHINITIS: ICD-10-CM

## 2025-02-11 DIAGNOSIS — H69.93 DYSFUNCTION OF BOTH EUSTACHIAN TUBES: Primary | ICD-10-CM

## 2025-02-11 PROCEDURE — G8399 PT W/DXA RESULTS DOCUMENT: HCPCS

## 2025-02-11 PROCEDURE — 1090F PRES/ABSN URINE INCON ASSESS: CPT

## 2025-02-11 PROCEDURE — 1160F RVW MEDS BY RX/DR IN RCRD: CPT

## 2025-02-11 PROCEDURE — 1159F MED LIST DOCD IN RCRD: CPT

## 2025-02-11 PROCEDURE — G8427 DOCREV CUR MEDS BY ELIG CLIN: HCPCS

## 2025-02-11 PROCEDURE — 99213 OFFICE O/P EST LOW 20 MIN: CPT

## 2025-02-11 PROCEDURE — 3017F COLORECTAL CA SCREEN DOC REV: CPT

## 2025-02-11 PROCEDURE — 92567 TYMPANOMETRY: CPT

## 2025-02-11 PROCEDURE — 1123F ACP DISCUSS/DSCN MKR DOCD: CPT

## 2025-02-11 PROCEDURE — G8419 CALC BMI OUT NRM PARAM NOF/U: HCPCS

## 2025-02-11 PROCEDURE — 4004F PT TOBACCO SCREEN RCVD TLK: CPT

## 2025-02-11 ASSESSMENT — ENCOUNTER SYMPTOMS
COUGH: 0
RHINORRHEA: 1
VOMITING: 0
ALLERGIC/IMMUNOLOGIC NEGATIVE: 1
SHORTNESS OF BREATH: 0
EYE DISCHARGE: 0
SINUS PRESSURE: 0
EYE PAIN: 0
SORE THROAT: 0
BACK PAIN: 0
DIARRHEA: 0

## 2025-02-11 NOTE — PROGRESS NOTES
This patient was referred for tympanometric testing by SUNITA Sommer due to repeated ear infections.     Tympanometry revealed shallow tympanograms, bilaterally.    The results were reviewed with the patient and ordering provider.     Recommendations for follow up will be made pending physician consult.      Rishi Venegas, CCC-A  Clinical Audiologist  [place] License: [id number]    Electronically signed by Helene Parada on 2/11/2025 at 3:17 PM

## 2025-02-11 NOTE — PROGRESS NOTES
Subjective:      Patient ID:  Doreen Nunez is a 69 y.o. female.    HPI:    Patient presents today for recheck of the right ear.  Patient is not doing better.  Drops:yes   Type:  ciprodex   Pain: no  Drainage: no   Wick: no    Cerumen impaction: no    Was seen in ER and was prescribed Augmentin 875-125mg which she took for 2 days before decreasing 1/2 table BID.   Did take 1/2 tablet twice daily for the remaining 5 days.   Restarted astelin per PCP about 4-5 days ago     Past Medical History:   Diagnosis Date    Arthritis     Bipolar disorder ([facility])     Breast cancer ([facility]) 2019    bilateral    Cancer of female genital organ ([facility])     h/o vaginal cancer [id number]    Capsulitis of toe, left 01/17/2024    Chronic back pain     Chronic neck pain     Dr Jean     COPD (chronic obstructive pulmonary disease) ([facility])     Depression     Dysphagia     Emphysema     Eustachian tube dysfunction, bilateral     GERD (gastroesophageal reflux disease)     Headache(784.0)     History of tobacco use     [place] (hard of hearing)     Hx antineoplastic chemo     Hyperlipidemia     Hypertension     Ingrown toenail 01/17/2024    Onychomycosis 01/17/2024    Osteoporosis     Dr. Muniz, Reclast injections    Pain of toe of left foot 01/17/2024    Pain of toe of right foot 01/17/2024    Paronychia of toe of left foot 01/17/2024    Paronychia of toe of right foot 01/17/2024     Past Surgical History:   Procedure Laterality Date    BREAST BIOPSY      CERVICAL FUSION  01/01/2003    ACDF C4-5 & C5-6    COLONOSCOPY  07/01/2015    Dr. Harvey    ECHO COMPLETE  08/22/2013         ESOPHAGEAL DILATATION N/A 11/15/2024    ESOPHAGOGASTRODUODENOSCOPY DILATION performed by Juan Pablo Langston MD at [facility] ENDOSCOPY    FOREARM SURGERY Right     INNER EAR SURGERY      Artificial TM on left, partial on right    MASTECTOMY, BILATERAL      NECK SURGERY      OTHER SURGICAL HISTORY Right 12/08/2015    wrist arthroplasty w/debridement ulnar shortening osteotomy

## 2025-02-14 ENCOUNTER — TELEPHONE (OUTPATIENT)
Dept: FAMILY MEDICINE CLINIC | Age: 70
End: 2025-02-14

## 2025-02-14 DIAGNOSIS — E55.9 VITAMIN D DEFICIENCY: Primary | ICD-10-CM

## 2025-02-14 DIAGNOSIS — M81.0 AGE-RELATED OSTEOPOROSIS WITHOUT CURRENT PATHOLOGICAL FRACTURE: ICD-10-CM

## 2025-02-14 NOTE — TELEPHONE ENCOUNTER
The lab has notified our office that your lab order for Vitamin D on date of service 9/18/24 was rejected for not meeting medical necessity.  Please provide a different appropriate billing code if one exists.        Action Needed  -Please record any billing code changes in this encounter.  (EPIC record of your coding is needed)      Please document note of new diagnosis and send back to SANTIAGO Camacho.      Thank you!

## 2025-02-17 NOTE — TELEPHONE ENCOUNTER
Added new diagnostic codes E55.9, M81.0.     Lesli Cheung DO  Family Medicine Resident, PGY-3  [facility]  2/17/2025 4:17 PM

## 2025-02-19 ENCOUNTER — OFFICE VISIT (OUTPATIENT)
Dept: FAMILY MEDICINE CLINIC | Age: 70
End: 2025-02-19
Payer: MEDICARE

## 2025-02-19 VITALS
DIASTOLIC BLOOD PRESSURE: 92 MMHG | WEIGHT: 135 LBS | RESPIRATION RATE: 18 BRPM | OXYGEN SATURATION: 99 % | BODY MASS INDEX: 25.51 KG/M2 | TEMPERATURE: 97.7 F | SYSTOLIC BLOOD PRESSURE: 148 MMHG | HEART RATE: 92 BPM

## 2025-02-19 DIAGNOSIS — M19.042 PRIMARY OSTEOARTHRITIS OF LEFT HAND: Primary | ICD-10-CM

## 2025-02-19 PROCEDURE — G8427 DOCREV CUR MEDS BY ELIG CLIN: HCPCS | Performed by: FAMILY MEDICINE

## 2025-02-19 PROCEDURE — 1090F PRES/ABSN URINE INCON ASSESS: CPT | Performed by: FAMILY MEDICINE

## 2025-02-19 PROCEDURE — 99214 OFFICE O/P EST MOD 30 MIN: CPT | Performed by: FAMILY MEDICINE

## 2025-02-19 PROCEDURE — G8419 CALC BMI OUT NRM PARAM NOF/U: HCPCS | Performed by: FAMILY MEDICINE

## 2025-02-19 PROCEDURE — G8399 PT W/DXA RESULTS DOCUMENT: HCPCS | Performed by: FAMILY MEDICINE

## 2025-02-19 PROCEDURE — 4004F PT TOBACCO SCREEN RCVD TLK: CPT | Performed by: FAMILY MEDICINE

## 2025-02-19 PROCEDURE — 1123F ACP DISCUSS/DSCN MKR DOCD: CPT | Performed by: FAMILY MEDICINE

## 2025-02-19 PROCEDURE — 1159F MED LIST DOCD IN RCRD: CPT | Performed by: FAMILY MEDICINE

## 2025-02-19 PROCEDURE — 3017F COLORECTAL CA SCREEN DOC REV: CPT | Performed by: FAMILY MEDICINE

## 2025-02-19 ASSESSMENT — ENCOUNTER SYMPTOMS
EYE REDNESS: 0
COUGH: 0
EYE PAIN: 0
ABDOMINAL PAIN: 0
VOMITING: 0
BLOOD IN STOOL: 0
PHOTOPHOBIA: 0
CHEST TIGHTNESS: 0
DIARRHEA: 0
SHORTNESS OF BREATH: 0
EYE DISCHARGE: 0
SINUS PAIN: 0
BACK PAIN: 0
ALLERGIC/IMMUNOLOGIC NEGATIVE: 1
TROUBLE SWALLOWING: 0
SORE THROAT: 0
NAUSEA: 0

## 2025-02-19 NOTE — PROGRESS NOTES
25  Doreen Nunez : 1955 Sex: female  Age: 69 y.o.      Assessment and Plan:  Doreen was seen today for hand pain.    Diagnoses and all orders for this visit:    Primary osteoarthritis of left hand  -     XR HAND LEFT (MIN 3 VIEWS); Future  -     Splint wrist    X-ray shows first MCP joint degenerative arthritis.  Will place her in left spica Splint.  She is to use Voltaren gel 2 to 3 g to that area 3 times a day.  She needs to follow-up with her PCP in 7 to 10 days for further care as needed.  If complaints worsen in any way, recheck here or with her PCP immediately.    Return 3 to 5-day recheck if not improved.    Chief Complaint   Patient presents with    Hand Pain     Left - no known injury - started yesterday       Patient is complaining of left hand and wrist pain originating from the base of the thumb.  She denies trauma or overuse.  Does have some swelling and stiffness there with decreased range of motion.  This has been ongoing over the last several weeks.        Review of Systems   Constitutional:  Negative for appetite change, fatigue and unexpected weight change.   HENT:  Negative for congestion, ear pain, hearing loss, sinus pain, sore throat and trouble swallowing.    Eyes:  Negative for photophobia, pain, discharge and redness.   Respiratory:  Negative for cough, chest tightness and shortness of breath.    Cardiovascular:  Negative for chest pain, palpitations and leg swelling.   Gastrointestinal:  Negative for abdominal pain, blood in stool, diarrhea, nausea and vomiting.   Endocrine: Negative.    Genitourinary:  Negative for dysuria, flank pain, frequency and hematuria.   Musculoskeletal:  Positive for arthralgias, joint swelling and myalgias. Negative for back pain.        Left hand   Skin: Negative.    Allergic/Immunologic: Negative.    Neurological:  Negative for dizziness, seizures, syncope, weakness, light-headedness, numbness and headaches.   Hematological:  Negative for

## 2025-03-18 ENCOUNTER — OFFICE VISIT (OUTPATIENT)
Dept: FAMILY MEDICINE CLINIC | Age: 70
End: 2025-03-18
Payer: COMMERCIAL

## 2025-03-18 ENCOUNTER — TELEPHONE (OUTPATIENT)
Dept: CARDIOLOGY | Age: 70
End: 2025-03-18

## 2025-03-18 VITALS
HEIGHT: 63 IN | SYSTOLIC BLOOD PRESSURE: 113 MMHG | WEIGHT: 132 LBS | OXYGEN SATURATION: 96 % | TEMPERATURE: 97.1 F | HEART RATE: 88 BPM | DIASTOLIC BLOOD PRESSURE: 85 MMHG | RESPIRATION RATE: 15 BRPM | BODY MASS INDEX: 23.39 KG/M2

## 2025-03-18 DIAGNOSIS — H69.90 DYSFUNCTION OF EUSTACHIAN TUBE, UNSPECIFIED LATERALITY: ICD-10-CM

## 2025-03-18 DIAGNOSIS — F31.9 BIPOLAR DEPRESSION (HCC): ICD-10-CM

## 2025-03-18 DIAGNOSIS — J44.9 CHRONIC OBSTRUCTIVE PULMONARY DISEASE, UNSPECIFIED COPD TYPE (HCC): ICD-10-CM

## 2025-03-18 DIAGNOSIS — R07.9 CHEST PAIN, UNSPECIFIED TYPE: ICD-10-CM

## 2025-03-18 DIAGNOSIS — J30.9 CHRONIC ALLERGIC RHINITIS: ICD-10-CM

## 2025-03-18 DIAGNOSIS — L82.1 SEBORRHEIC KERATOSIS: ICD-10-CM

## 2025-03-18 PROCEDURE — 1159F MED LIST DOCD IN RCRD: CPT

## 2025-03-18 PROCEDURE — 99214 OFFICE O/P EST MOD 30 MIN: CPT

## 2025-03-18 PROCEDURE — 1123F ACP DISCUSS/DSCN MKR DOCD: CPT

## 2025-03-18 PROCEDURE — 3023F SPIROM DOC REV: CPT

## 2025-03-18 PROCEDURE — 1090F PRES/ABSN URINE INCON ASSESS: CPT

## 2025-03-18 PROCEDURE — G8420 CALC BMI NORM PARAMETERS: HCPCS

## 2025-03-18 PROCEDURE — 4004F PT TOBACCO SCREEN RCVD TLK: CPT

## 2025-03-18 PROCEDURE — G8427 DOCREV CUR MEDS BY ELIG CLIN: HCPCS

## 2025-03-18 PROCEDURE — 3017F COLORECTAL CA SCREEN DOC REV: CPT

## 2025-03-18 PROCEDURE — G8399 PT W/DXA RESULTS DOCUMENT: HCPCS

## 2025-03-18 RX ORDER — ASPIRIN 81 MG/1
81 TABLET ORAL DAILY
Qty: 90 TABLET | Refills: 0 | Status: SHIPPED | OUTPATIENT
Start: 2025-03-18

## 2025-03-18 NOTE — PROGRESS NOTES
prophylactically with high risk with smoking and hyperlipidemia, emphasized ED protocol  2) chronic eustachian tube dysfunction-referral to ENT with change in insurance  3) bipolar depression-stable, following with counselor weekly  4) COPD-albuterol inhaler as needed  5) seborrheic keratosis-continue to monitor, patient refused removal at this time but we did discuss possible referral in the future      Health Maintenance Due   Topic Date Due    Shingles vaccine (2 of 2) 02/04/2022         Attending Physician Statement  I have discussed the case, including pertinent history and exam findings with the resident. I also have seen the patient and performed key portions of the examination.  I agree with the documented assessment and plan.      Addy De León, DO    
patient opting to defer at this time but open to it in the future    RTO: after echo/stress or sooner prn    Call or go to ED immediately if symptoms worsen or persist. Advised patient to call with any new medication issues, and, as applicable, read all Rx info from pharmacy to assure aware of all possible risks and side effects of medication before taking.    Counseled regarding above diagnosis, including possible risks and complications, especially if left uncontrolled. Counseled regarding the possible side effects, risks, benefits and alternatives to treatment; patient and/or guardian verbalizes understanding, agrees, feels comfortable with and wishes to proceed with above treatment plan.    Attending physician verbalized understanding and agreement to above plan.     An electronic signature was used to authenticate this note.  ---- Lesli Cheung,    PGY-3 Family Medicine Resident   3/18/2025 at 12:40 PM

## 2025-03-31 ENCOUNTER — TELEPHONE (OUTPATIENT)
Dept: CARDIOLOGY | Age: 70
End: 2025-03-31

## 2025-03-31 NOTE — TELEPHONE ENCOUNTER
Spoke with patient to confirm stress test and echo for Wednesday, 04/2/25, starting at 0700.  Instructions given and medications reviewed.  Patient instructed no medication holds and to bring inhaler to test.  Also instructed no caffeine products for 12 hours prior to test.  All questions answered.

## 2025-04-02 ENCOUNTER — HOSPITAL ENCOUNTER (OUTPATIENT)
Dept: CARDIOLOGY | Age: 70
Discharge: HOME | End: 2025-04-04
Payer: COMMERCIAL

## 2025-04-02 VITALS
HEIGHT: 61 IN | SYSTOLIC BLOOD PRESSURE: 122 MMHG | WEIGHT: 132 LBS | BODY MASS INDEX: 24.92 KG/M2 | DIASTOLIC BLOOD PRESSURE: 84 MMHG | RESPIRATION RATE: 16 BRPM | HEART RATE: 76 BPM

## 2025-04-02 DIAGNOSIS — R07.9 CHEST PAIN, UNSPECIFIED TYPE: ICD-10-CM

## 2025-04-02 LAB
ECHO BSA: 1.61 M2
NUC STRESS EJECTION FRACTION: 76 %
STRESS BASELINE DIAS BP: 84 MMHG
STRESS BASELINE HR: 83 BPM
STRESS BASELINE SYS BP: 122 MMHG
STRESS ESTIMATED WORKLOAD: 1.1 METS
STRESS PEAK DIAS BP: 84 MMHG
STRESS PEAK SYS BP: 140 MMHG
STRESS PERCENT HR ACHIEVED: 81 %
STRESS POST PEAK HR: 122 BPM
STRESS RATE PRESSURE PRODUCT: NORMAL BPM*MMHG
STRESS TARGET HR: 151 BPM

## 2025-04-02 PROCEDURE — 93017 CV STRESS TEST TRACING ONLY: CPT

## 2025-04-02 PROCEDURE — 93306 TTE W/DOPPLER COMPLETE: CPT

## 2025-04-02 PROCEDURE — 3430000000 HC RX DIAGNOSTIC RADIOPHARMACEUTICAL: Performed by: INTERNAL MEDICINE

## 2025-04-02 PROCEDURE — 2500000003 HC RX 250 WO HCPCS: Performed by: INTERNAL MEDICINE

## 2025-04-02 PROCEDURE — 6360000002 HC RX W HCPCS

## 2025-04-02 PROCEDURE — A9500 TC99M SESTAMIBI: HCPCS | Performed by: INTERNAL MEDICINE

## 2025-04-02 RX ORDER — SODIUM CHLORIDE 0.9 % (FLUSH) 0.9 %
10 SYRINGE (ML) INJECTION PRN
Status: DISCONTINUED | OUTPATIENT
Start: 2025-04-02 | End: 2025-04-05 | Stop reason: HOSPADM

## 2025-04-02 RX ORDER — TETRAKIS(2-METHOXYISOBUTYLISOCYANIDE)COPPER(I) TETRAFLUOROBORATE 1 MG/ML
33.6 INJECTION, POWDER, LYOPHILIZED, FOR SOLUTION INTRAVENOUS
Status: COMPLETED | OUTPATIENT
Start: 2025-04-02 | End: 2025-04-02

## 2025-04-02 RX ORDER — TETRAKIS(2-METHOXYISOBUTYLISOCYANIDE)COPPER(I) TETRAFLUOROBORATE 1 MG/ML
10.4 INJECTION, POWDER, LYOPHILIZED, FOR SOLUTION INTRAVENOUS
Status: COMPLETED | OUTPATIENT
Start: 2025-04-02 | End: 2025-04-02

## 2025-04-02 RX ORDER — REGADENOSON 0.08 MG/ML
0.4 INJECTION, SOLUTION INTRAVENOUS
Status: COMPLETED | OUTPATIENT
Start: 2025-04-02 | End: 2025-04-02

## 2025-04-02 RX ADMIN — Medication 33.6 MILLICURIE: at 09:07

## 2025-04-02 RX ADMIN — REGADENOSON 0.4 MG: 0.08 INJECTION, SOLUTION INTRAVENOUS at 09:07

## 2025-04-02 RX ADMIN — SODIUM CHLORIDE, PRESERVATIVE FREE 10 ML: 5 INJECTION INTRAVENOUS at 07:14

## 2025-04-02 RX ADMIN — Medication 10.4 MILLICURIE: at 07:14

## 2025-04-02 RX ADMIN — SODIUM CHLORIDE, PRESERVATIVE FREE 10 ML: 5 INJECTION INTRAVENOUS at 09:08

## 2025-04-02 RX ADMIN — SODIUM CHLORIDE, PRESERVATIVE FREE 10 ML: 5 INJECTION INTRAVENOUS at 09:07

## 2025-04-04 ENCOUNTER — RESULTS FOLLOW-UP (OUTPATIENT)
Dept: FAMILY MEDICINE CLINIC | Age: 70
End: 2025-04-04

## 2025-04-04 LAB
ECHO AO ASC DIAM: 2.4 CM
ECHO AO ASCENDING AORTA INDEX: 1.52 CM/M2
ECHO AV AREA PEAK VELOCITY: 2.1 CM2
ECHO AV AREA VTI: 2.2 CM2
ECHO AV AREA/BSA PEAK VELOCITY: 1.3 CM2/M2
ECHO AV AREA/BSA VTI: 1.4 CM2/M2
ECHO AV CUSP MM: 1.8 CM
ECHO AV MEAN GRADIENT: 2 MMHG
ECHO AV MEAN VELOCITY: 0.7 M/S
ECHO AV PEAK GRADIENT: 4 MMHG
ECHO AV PEAK VELOCITY: 1 M/S
ECHO AV VELOCITY RATIO: 0.8
ECHO AV VTI: 19.9 CM
ECHO BSA: 1.61 M2
ECHO LA DIAMETER INDEX: 1.33 CM/M2
ECHO LA DIAMETER: 2.1 CM
ECHO LA VOL A-L A2C: 28 ML (ref 22–52)
ECHO LA VOL A-L A4C: 16 ML (ref 22–52)
ECHO LA VOL MOD A2C: 27 ML (ref 22–52)
ECHO LA VOL MOD A4C: 14 ML (ref 22–52)
ECHO LA VOLUME AREA LENGTH: 22 ML
ECHO LA VOLUME INDEX A-L A2C: 18 ML/M2 (ref 16–34)
ECHO LA VOLUME INDEX A-L A4C: 10 ML/M2 (ref 16–34)
ECHO LA VOLUME INDEX AREA LENGTH: 14 ML/M2 (ref 16–34)
ECHO LA VOLUME INDEX MOD A2C: 17 ML/M2 (ref 16–34)
ECHO LA VOLUME INDEX MOD A4C: 9 ML/M2 (ref 16–34)
ECHO LV EJECTION FRACTION 3D: 55 %
ECHO LV FRACTIONAL SHORTENING: 33 % (ref 28–44)
ECHO LV INTERNAL DIMENSION DIASTOLE INDEX: 2.85 CM/M2
ECHO LV INTERNAL DIMENSION DIASTOLIC: 4.5 CM (ref 3.9–5.3)
ECHO LV INTERNAL DIMENSION SYSTOLIC INDEX: 1.9 CM/M2
ECHO LV INTERNAL DIMENSION SYSTOLIC: 3 CM
ECHO LV ISOVOLUMETRIC RELAXATION TIME (IVRT): 110.7 MS
ECHO LV IVSD: 0.8 CM (ref 0.6–0.9)
ECHO LV IVSS: 1.3 CM
ECHO LV MASS 2D: 113.6 G (ref 67–162)
ECHO LV MASS INDEX 2D: 71.9 G/M2 (ref 43–95)
ECHO LV POSTERIOR WALL DIASTOLIC: 0.8 CM (ref 0.6–0.9)
ECHO LV POSTERIOR WALL SYSTOLIC: 1.3 CM
ECHO LV RELATIVE WALL THICKNESS RATIO: 0.36
ECHO LVOT AREA: 2.8 CM2
ECHO LVOT AV VTI INDEX: 0.8
ECHO LVOT DIAM: 1.9 CM
ECHO LVOT MEAN GRADIENT: 1 MMHG
ECHO LVOT PEAK GRADIENT: 2 MMHG
ECHO LVOT PEAK VELOCITY: 0.8 M/S
ECHO LVOT STROKE VOLUME INDEX: 28.7 ML/M2
ECHO LVOT SV: 45.3 ML
ECHO LVOT VTI: 16 CM
ECHO MV "A" WAVE DURATION: 66.9 MSEC
ECHO MV A VELOCITY: 0.82 M/S
ECHO MV AREA PHT: 3.6 CM2
ECHO MV AREA VTI: 2.7 CM2
ECHO MV E DECELERATION TIME (DT): 214.3 MS
ECHO MV E VELOCITY: 0.68 M/S
ECHO MV E/A RATIO: 0.83
ECHO MV LVOT VTI INDEX: 1.06
ECHO MV MAX VELOCITY: 1 M/S
ECHO MV MEAN GRADIENT: 1 MMHG
ECHO MV MEAN VELOCITY: 0.6 M/S
ECHO MV PEAK GRADIENT: 4 MMHG
ECHO MV PRESSURE HALF TIME (PHT): 61.6 MS
ECHO MV VTI: 16.9 CM
ECHO PULMONARY ARTERY END DIASTOLIC PRESSURE: 4 MMHG
ECHO PV MAX VELOCITY: 0.9 M/S
ECHO PV MEAN GRADIENT: 2 MMHG
ECHO PV MEAN VELOCITY: 0.6 M/S
ECHO PV PEAK GRADIENT: 3 MMHG
ECHO PV REGURGITANT MAX VELOCITY: 1 M/S
ECHO PV VTI: 19.1 CM
ECHO PVEIN A DURATION: 90 MS
ECHO PVEIN A VELOCITY: 0.2 M/S
ECHO PVEIN PEAK D VELOCITY: 0.4 M/S
ECHO PVEIN PEAK S VELOCITY: 0.8 M/S
ECHO PVEIN S/D RATIO: 2
ECHO RV INTERNAL DIMENSION: 1.8 CM

## 2025-04-04 NOTE — RESULT ENCOUNTER NOTE
Stress test and echocardiogram results are reassuring. I encourage her to quit smoking for her heart health and continue the baby aspirin and rosuvastatin at this time. If she were to develop worsening chest pain or chest pain that fails to improve or if new symptoms develop such as shortness of breath or peripheral swelling, she should go to the ED.

## 2025-04-15 ENCOUNTER — OFFICE VISIT (OUTPATIENT)
Dept: ENT CLINIC | Age: 70
End: 2025-04-15
Payer: COMMERCIAL

## 2025-04-15 VITALS
TEMPERATURE: 97.6 F | RESPIRATION RATE: 18 BRPM | DIASTOLIC BLOOD PRESSURE: 83 MMHG | WEIGHT: 136.2 LBS | BODY MASS INDEX: 25.71 KG/M2 | SYSTOLIC BLOOD PRESSURE: 122 MMHG | HEIGHT: 61 IN | OXYGEN SATURATION: 96 % | HEART RATE: 89 BPM

## 2025-04-15 DIAGNOSIS — H69.93 DYSFUNCTION OF BOTH EUSTACHIAN TUBES: ICD-10-CM

## 2025-04-15 DIAGNOSIS — H92.01 RIGHT EAR PAIN: ICD-10-CM

## 2025-04-15 DIAGNOSIS — J30.9 CHRONIC ALLERGIC RHINITIS: Primary | ICD-10-CM

## 2025-04-15 PROCEDURE — 1090F PRES/ABSN URINE INCON ASSESS: CPT

## 2025-04-15 PROCEDURE — 4004F PT TOBACCO SCREEN RCVD TLK: CPT

## 2025-04-15 PROCEDURE — G8427 DOCREV CUR MEDS BY ELIG CLIN: HCPCS

## 2025-04-15 PROCEDURE — 99213 OFFICE O/P EST LOW 20 MIN: CPT

## 2025-04-15 PROCEDURE — 1123F ACP DISCUSS/DSCN MKR DOCD: CPT

## 2025-04-15 PROCEDURE — G8419 CALC BMI OUT NRM PARAM NOF/U: HCPCS

## 2025-04-15 PROCEDURE — G8399 PT W/DXA RESULTS DOCUMENT: HCPCS

## 2025-04-15 PROCEDURE — 1159F MED LIST DOCD IN RCRD: CPT

## 2025-04-15 PROCEDURE — 3017F COLORECTAL CA SCREEN DOC REV: CPT

## 2025-04-15 PROCEDURE — 1160F RVW MEDS BY RX/DR IN RCRD: CPT

## 2025-04-15 RX ORDER — FLUTICASONE PROPIONATE 50 MCG
2 SPRAY, SUSPENSION (ML) NASAL DAILY
Qty: 48 G | Refills: 0 | Status: SHIPPED | OUTPATIENT
Start: 2025-04-15

## 2025-04-15 RX ORDER — AZELASTINE 1 MG/ML
1 SPRAY, METERED NASAL 2 TIMES DAILY
Qty: 30 ML | Refills: 1 | Status: SHIPPED | OUTPATIENT
Start: 2025-04-15

## 2025-04-15 NOTE — PROGRESS NOTES
Subjective:      Patient ID:  Doreen Nunez is a 69 y.o. female.    HPI:  Pt returns for recheck of allergies.       History of   no surgery      Nasal Steroid: yes   Name: fluticasone (Flonase)   Still taking: yes    Other therapy:   Astelin- yes -used for one week and discontinued use.  oral antihistamine- none  leukotriene inhibitor- none  oral decongestant- none    which has been  somewhat effective     Pt has been on therapy for 1 year(s) and is doing adequately    States she had pain in the right ear about a week and a half ago.  Left ear hurt a few days ago.   States the pain in each ear lasted a couple hours then went away.     The patient is complaining of rhinorrhea and PND    The patients worst time of year is all seasons      Patient's medications, allergies, past medical, surgical, social and family histories were reviewed and updated as appropriate.        Review of Systems   Constitutional:  Negative for chills and fever.   HENT:  Positive for ear pain, postnasal drip and rhinorrhea. Negative for congestion, ear discharge, hearing loss, sinus pressure, sneezing and sore throat.    Eyes:  Negative for pain and discharge.   Respiratory:  Negative for cough and shortness of breath.    Cardiovascular:  Negative for chest pain.   Gastrointestinal:  Negative for diarrhea and vomiting.   Genitourinary:  Negative for flank pain.   Musculoskeletal:  Negative for back pain and neck pain.   Skin:  Negative for rash.   Allergic/Immunologic: Negative.    Neurological:  Negative for syncope and headaches.   All other systems reviewed and are negative.      Objective:     Vitals:    04/15/25 1455   BP: 122/83   Pulse: 89   Resp: 18   Temp: 97.6 °F (36.4 °C)   SpO2: 96%     Physical Exam  Vitals reviewed.   Constitutional:       Appearance: Normal appearance.   HENT:      Head: Normocephalic and atraumatic.      Jaw: There is normal jaw occlusion. No tenderness.      Right Ear: Tympanic membrane, ear canal and

## 2025-04-17 DIAGNOSIS — M85.80 OSTEOPENIA, UNSPECIFIED LOCATION: Primary | ICD-10-CM

## 2025-04-17 NOTE — PROGRESS NOTES
Called to Dr. Cheung's office mayra Valenzuela notified of need for new Prolia script with diagnosis code. I called patient to remind her to have labs done before the appointment and she told me that Dr. Galdamez and her discussed and the doctor wanted her to stop the Prolia injections and she had been meaning to call us and cancel the appointment. Patient did cancel the appointment.

## 2025-04-17 NOTE — TELEPHONE ENCOUNTER
Infusion Center called in, stating that they need a signed script for Prolia with diagnosis, the Infusion referral sheet signed, and a recent calcium level before pt has prolia injection on 4/25/25. Infusion referral sheet is filled out and waiting for signature in your mailbox. Prolia and lab order are pended.    Be sure to sign orders at a computer you can print the script out at :)    Please fax all of this to [phone]

## 2025-04-17 NOTE — TELEPHONE ENCOUNTER
Per documentation from 11/7/2024:    \"STOP PROLIA given patient's intended jaw surgery in the future, increased risk for osteonecrosis of the jaw, patient verbalizes understanding.\"    My understanding is that the patient is undergoing evaluation for thinning bone in her jaw, Prolia contraindicated at this time.     Lesli Cheung DO  Family Medicine Resident, PGY-3  [facility]  4/17/2025 12:56 PM

## 2025-04-25 ENCOUNTER — HOSPITAL ENCOUNTER (OUTPATIENT)
Dept: INFUSION THERAPY | Age: 70
Setting detail: INFUSION SERIES
Discharge: HOME | End: 2025-04-25

## 2025-04-29 ENCOUNTER — HOSPITAL ENCOUNTER (OUTPATIENT)
Age: 70
Discharge: HOME | End: 2025-04-29
Payer: COMMERCIAL

## 2025-04-29 ENCOUNTER — OFFICE VISIT (OUTPATIENT)
Dept: ONCOLOGY | Age: 70
End: 2025-04-29
Payer: COMMERCIAL

## 2025-04-29 ENCOUNTER — HOSPITAL ENCOUNTER (OUTPATIENT)
Dept: INFUSION THERAPY | Age: 70
End: 2025-04-29

## 2025-04-29 VITALS
HEIGHT: 61 IN | SYSTOLIC BLOOD PRESSURE: 115 MMHG | OXYGEN SATURATION: 98 % | DIASTOLIC BLOOD PRESSURE: 84 MMHG | HEART RATE: 87 BPM | TEMPERATURE: 97.9 F | WEIGHT: 128.4 LBS | BODY MASS INDEX: 24.24 KG/M2

## 2025-04-29 DIAGNOSIS — Z85.3 PERSONAL HISTORY OF MALIGNANT NEOPLASM OF BREAST: Primary | ICD-10-CM

## 2025-04-29 DIAGNOSIS — Z85.3 PERSONAL HISTORY OF MALIGNANT NEOPLASM OF BREAST: ICD-10-CM

## 2025-04-29 LAB
ALBUMIN SERPL-MCNC: 4.5 G/DL (ref 3.5–5.2)
ALP SERPL-CCNC: 67 U/L (ref 35–104)
ALT SERPL-CCNC: 14 U/L (ref 0–32)
ANION GAP SERPL CALCULATED.3IONS-SCNC: 12 MMOL/L (ref 7–16)
AST SERPL-CCNC: 19 U/L (ref 0–31)
BASOPHILS # BLD: 0.03 K/UL (ref 0–0.2)
BASOPHILS NFR BLD: 1 % (ref 0–2)
BILIRUB SERPL-MCNC: 0.7 MG/DL (ref 0–1.2)
BUN SERPL-MCNC: 14 MG/DL (ref 6–23)
CALCIUM SERPL-MCNC: 9.7 MG/DL (ref 8.6–10.2)
CHLORIDE SERPL-SCNC: 103 MMOL/L (ref 98–107)
CO2 SERPL-SCNC: 25 MMOL/L (ref 22–29)
CREAT SERPL-MCNC: 0.6 MG/DL (ref 0.5–1)
EOSINOPHIL # BLD: 0.08 K/UL (ref 0.05–0.5)
EOSINOPHILS RELATIVE PERCENT: 1 % (ref 0–6)
ERYTHROCYTE [DISTWIDTH] IN BLOOD BY AUTOMATED COUNT: 14.2 % (ref 11.5–15)
GFR, ESTIMATED: >90 ML/MIN/1.73M2
GLUCOSE SERPL-MCNC: 104 MG/DL (ref 74–99)
HCT VFR BLD AUTO: 42.5 % (ref 34–48)
HGB BLD-MCNC: 14 G/DL (ref 11.5–15.5)
IMM GRANULOCYTES # BLD AUTO: <0.03 K/UL (ref 0–0.58)
IMM GRANULOCYTES NFR BLD: 0 % (ref 0–5)
LYMPHOCYTES NFR BLD: 2.02 K/UL (ref 1.5–4)
LYMPHOCYTES RELATIVE PERCENT: 31 % (ref 20–42)
MCH RBC QN AUTO: 28.9 PG (ref 26–35)
MCHC RBC AUTO-ENTMCNC: 32.9 G/DL (ref 32–34.5)
MCV RBC AUTO: 87.6 FL (ref 80–99.9)
MONOCYTES NFR BLD: 0.52 K/UL (ref 0.1–0.95)
MONOCYTES NFR BLD: 8 % (ref 2–12)
NEUTROPHILS NFR BLD: 59 % (ref 43–80)
NEUTS SEG NFR BLD: 3.92 K/UL (ref 1.8–7.3)
PLATELET # BLD AUTO: 229 K/UL (ref 130–450)
PMV BLD AUTO: 10.8 FL (ref 7–12)
POTASSIUM SERPL-SCNC: 3.8 MMOL/L (ref 3.5–5)
PROT SERPL-MCNC: 7 G/DL (ref 6.4–8.3)
RBC # BLD AUTO: 4.85 M/UL (ref 3.5–5.5)
SODIUM SERPL-SCNC: 140 MMOL/L (ref 132–146)
WBC OTHER # BLD: 6.6 K/UL (ref 4.5–11.5)

## 2025-04-29 PROCEDURE — 85025 COMPLETE CBC W/AUTO DIFF WBC: CPT

## 2025-04-29 PROCEDURE — 80053 COMPREHEN METABOLIC PANEL: CPT

## 2025-04-29 PROCEDURE — 36415 COLL VENOUS BLD VENIPUNCTURE: CPT

## 2025-04-29 PROCEDURE — 99212 OFFICE O/P EST SF 10 MIN: CPT

## 2025-04-29 NOTE — PROGRESS NOTES
[facility] PHYSICIANS [facility] MEDICAL ONCOLOGY  [address] [id number]  Dept: [phone]  Loc: [phone]    Ileana Amos MD   ·   MD Yanely Lopez APRN  ·  JUAN ANTONIO Chase    Attending Progress Note      Reason for Visit:   Left breast invasive carcinoma, and right breast DCIS.    Referring Physician:  Bernadette Cruz APRN - CNP    PCP:  Lesli Cheung DO      Chief Complaint   Patient presents with    Follow-up         SUBJECTIVE:  Continues to do well without major issues.  Denies of new pain, nausea, vomiting, back pain.      ONCOLOGIC HISTORY:    Doreen Nunez is a 68 y.o. year old woman with a past medical history of essential hypertension, GERD, history of suicide attempt in her 20s, bipolar disorder, hyperlipidemia, COPD with tobacco use disorder, chronic back pain, and history of bilateral breast cancer.  She also reports a history of \"vaginal\" cancer in .  Review of records indicate a Pap smear on 09/15/2015 revealed low-grade squamous intraepithelial lesion/mild dysplasia with HPV cytopathic effect.     She presents today to establish care for her history of bilateral breast cancer which was diagnosed while living in [place] in 2019 post bilateral mastectomies.  She reports she underwent adjuvant chemotherapy for approximately 6 months.  Was recommended radiation therapy in the postmastectomy setting to the left breast but she declined.  She was on endocrine therapy from  to May 2023 at which time she decided to stop it as she reports she was advised it could interfere with her hormones/premarin topical.     Risks for breast cancer include age, gender, sister with breast cancer at age 50.  Sister with history of melanoma and metastatic disease to the brain at age 59, and a nephew with gastric cancer/GIST at age 54.  Menarche age 12-13.  Menopause age 50.  .  First live birth age 18.  She did

## 2025-05-02 NOTE — PROGRESS NOTES
[facility] PRE-ADMISSION TESTING INSTRUCTIONS    The Preadmission Testing patient is instructed accordingly using the following criteria (check applicable):    ARRIVAL INSTRUCTIONS:  Arrive at 0[address] via [address] enter through Main Entrance A    [x] Parking the day of Surgery is located in the Main Entrance lot.  Upon entering the door, make an immediate right to the surgery reception desk    [x] Bring photo ID and insurance card    [] Bring in a copy of Living will or Durable Power of  papers.    [x] Please be sure to arrange for a responsible adult to provide transportation to and from the hospital    [x] Please arrange for a responsible adult to be with you for the 24 hour period post procedure due to having anesthesia    [x] If you awake am of surgery not feeling well or have temperature >100 please call [phone]    GENERAL INSTRUCTIONS:    [x] Follow Dr. Langston preop dietary instructions no gum, candy or mints.   May have up to 8oz clear liquids up to 4 hours prior to procedure     [x] Follow bowel prep as instructed per surgeon       [x] You may brush your teeth, do not swallow any toothpaste    [x] Take medications as instructed in am of surgery: pantoprazole, may use albuterol inhaler if needed and bring to hospital day of procedure.    [x] Stop herbal supplements and vitamins 5 days prior to procedure    [] No diabetic medications after midnight    [x] No tobacco products within 24 hours of surgery     [x] No alcohol or illegal drug use, marijuana included, within 24 hours of surgery.    [] Bring urine specimen day of surgery    [x] Shower or bath with soap, lather and rinse well, AM of Surgery, no lotion, powders or creams to surgical site    [x] Jewelry, body piercing's, eyeglasses, contact lenses and dentures are not permitted into surgery (bring cases)      [x] Please do not wear any nail polish, make up or hair products on the day of surgery    [x] You can

## 2025-05-06 ENCOUNTER — ANESTHESIA EVENT (OUTPATIENT)
Dept: ENDOSCOPY | Age: 70
End: 2025-05-06
Payer: MEDICARE

## 2025-05-06 ENCOUNTER — HOSPITAL ENCOUNTER (OUTPATIENT)
Age: 70
Setting detail: OUTPATIENT SURGERY
Discharge: HOME | End: 2025-05-06
Attending: SURGERY | Admitting: SURGERY
Payer: MEDICARE

## 2025-05-06 ENCOUNTER — ANESTHESIA (OUTPATIENT)
Dept: ENDOSCOPY | Age: 70
End: 2025-05-06
Payer: MEDICARE

## 2025-05-06 VITALS
HEART RATE: 75 BPM | WEIGHT: 130 LBS | OXYGEN SATURATION: 98 % | SYSTOLIC BLOOD PRESSURE: 132 MMHG | RESPIRATION RATE: 16 BRPM | BODY MASS INDEX: 24.55 KG/M2 | DIASTOLIC BLOOD PRESSURE: 88 MMHG | HEIGHT: 61 IN

## 2025-05-06 PROCEDURE — 6360000002 HC RX W HCPCS: Performed by: NURSE ANESTHETIST, CERTIFIED REGISTERED

## 2025-05-06 PROCEDURE — 3609027000 HC COLONOSCOPY: Performed by: SURGERY

## 2025-05-06 PROCEDURE — 2709999900 HC NON-CHARGEABLE SUPPLY: Performed by: SURGERY

## 2025-05-06 PROCEDURE — 3700000001 HC ADD 15 MINUTES (ANESTHESIA): Performed by: SURGERY

## 2025-05-06 PROCEDURE — G0121 COLON CA SCRN NOT HI RSK IND: HCPCS | Performed by: SURGERY

## 2025-05-06 PROCEDURE — 7100000010 HC PHASE II RECOVERY - FIRST 15 MIN: Performed by: SURGERY

## 2025-05-06 PROCEDURE — 7100000011 HC PHASE II RECOVERY - ADDTL 15 MIN: Performed by: SURGERY

## 2025-05-06 PROCEDURE — 3700000000 HC ANESTHESIA ATTENDED CARE: Performed by: SURGERY

## 2025-05-06 RX ORDER — PROPOFOL 10 MG/ML
INJECTION, EMULSION INTRAVENOUS
Status: DISCONTINUED | OUTPATIENT
Start: 2025-05-06 | End: 2025-05-06 | Stop reason: SDUPTHER

## 2025-05-06 RX ORDER — LIDOCAINE HYDROCHLORIDE 20 MG/ML
INJECTION, SOLUTION EPIDURAL; INFILTRATION; INTRACAUDAL; PERINEURAL
Status: DISCONTINUED | OUTPATIENT
Start: 2025-05-06 | End: 2025-05-06 | Stop reason: SDUPTHER

## 2025-05-06 RX ADMIN — PROPOFOL 100 MG: 10 INJECTION, EMULSION INTRAVENOUS at 08:41

## 2025-05-06 RX ADMIN — LIDOCAINE HYDROCHLORIDE 20 MG: 20 INJECTION, SOLUTION EPIDURAL; INFILTRATION; INTRACAUDAL; PERINEURAL at 08:41

## 2025-05-06 ASSESSMENT — LIFESTYLE VARIABLES: SMOKING_STATUS: 1

## 2025-05-06 NOTE — ANESTHESIA POSTPROCEDURE EVALUATION
Department of Anesthesiology  Postprocedure Note    Patient: Doreen Nunez  MRN: [id number]  YOB: 1955  Date of evaluation: 5/6/2025    Procedure Summary       Date: 05/06/25 Room / Location: [address]    Anesthesia Start: 0837 Anesthesia Stop: 0855    Procedure: COLORECTAL CANCER SCREENING, NOT HIGH RISK Diagnosis:       Colon cancer screening      (Colon cancer screening [Z12.11])    Surgeons: Juan Pablo Langston MD Responsible Provider: Saima Crain DO    Anesthesia Type: MAC ASA Status: 3            Anesthesia Type: No value filed.    Sofy Phase I: Sofy Score: 10    Sofy Phase II:      Anesthesia Post Evaluation    Patient location during evaluation: PACU  Patient participation: complete - patient participated  Level of consciousness: awake and sleepy but conscious  Pain score: 0  Airway patency: patent  Nausea & Vomiting: no nausea and no vomiting  Cardiovascular status: blood pressure returned to baseline and hemodynamically stable  Respiratory status: acceptable  Hydration status: stable  Pain management: adequate and satisfactory to patient        No notable events documented.

## 2025-05-06 NOTE — ANESTHESIA PRE PROCEDURE
Department of Anesthesiology  Preprocedure Note       Name:  Doreen Nunez   Age:  69 y.o.  :  1955                                          MRN:  [id number]         Date:  2025      Surgeon: Surgeon(s):  Juan Pablo Langston MD    Procedure: Procedure(s):  COLORECTAL CANCER SCREENING, NOT HIGH RISK    Medications prior to admission:   Prior to Admission medications    Medication Sig Start Date End Date Taking? Authorizing Provider   Multiple Vitamin (MULTIVITAMIN ADULT PO) Take 1 tablet by mouth daily   Yes ProviderMontserrat MD   Collagen-Vitamin C-Biotin (COLLAGEN PO) Take 1 tablet by mouth daily   Yes Provider, MD Montserrat   NONFORMULARY 2 tablets daily Horse tail   Yes Montserrat Dill MD   NONFORMULARY 1 tablet daily OTC thyroid supplement   Yes ProviderMontserrat MD   vitamin D3 (CHOLECALCIFEROL) 25 MCG (1000 UT) TABS tablet Take 1 tablet by mouth daily 25  Yes Lesli Cheung DO   magnesium 200 MG TABS tablet Take by mouth   Yes ProviderMontserrat MD   Mis Natural Products (BRAINSTRONG MEMORY SUPPORT PO)    Yes Provider, MD Montserrat   azelastine (ASTELIN) 0.1 % nasal spray 1 spray by Nasal route 2 times daily Use in each nostril as directed 4/15/25   Nic Blank APRN - CNP   fluticasone (FLONASE) 50 MCG/ACT nasal spray 2 sprays by Each Nostril route daily Shake liquid 4/15/25   Nic Blank APRN - CNP   aspirin 81 MG EC tablet Take 1 tablet by mouth daily 3/18/25   Lesli Cheung DO   rosuvastatin (CRESTOR) 20 MG tablet Take 1 tablet by mouth nightly 2/10/25   Lesli Cheung DO   pantoprazole (PROTONIX) 40 MG tablet Take 1 tablet by mouth daily 2/10/25   Lesli Cheung DO   nicotine (NICODERM CQ) 14 MG/24HR Place 1 patch onto the skin daily for 28 days  Patient not taking: Reported on 2025  Lesli Cheung DO   ibuprofen (ADVIL;MOTRIN) 800 MG tablet Take 1 tablet by mouth daily as needed for Pain (right hip pain) 24   Blanquita Cade MD

## 2025-05-06 NOTE — H&P
Expand All Collapse All[]Expand All by Default         [facility] Surgery Clinic Note     Assessment & Plan  1. GERD.  She is currently on Protonix, which controls her reflux well. She is advised to continue with Protonix and monitor her diet, particularly avoiding caffeine, spicy foods, and sauces. It was discussed that the long-term use of Protonix can lead to osteoporosis, bone density issues, kidney problems, and dementia, although the risk is very low, and she does not want to switch medications.     2. Pharyngoesophageal dysphagia.  The patient reports improvement in swallowing after esophageal dilation. She is advised to take small bites and chew well, especially considering her dentures.     3. Osteoporosis.  The patient has a history of osteoporosis, which has improved to osteopenia. She attributes this improvement to increased water intake, dietary changes, and weight loss. Continued monitoring and maintaining a healthy lifestyle are recommended.     4. Health Maintenance.  The patient is due for a colonoscopy next year. She will be contacted to schedule the procedure.        Return for Colonoscopy.     Doreen was seen today for follow-up.     Diagnoses and all orders for this visit:     Pharyngoesophageal dysphagia     Gastroesophageal reflux disease, unspecified whether esophagitis present     Gastroduodenitis     Encounter for screening colonoscopy                   Chief Complaint   Patient presents with    Follow-up       EGD follow up, 11-15-24         PCP: Lesli Cheung DO  CC: No ref. provider found      Doreen Nunez is a 69 y.o. female .     History of Present Illness  The patient presents for a follow-up of EGD for diagnoses of GERD and pharyngoesophageal dysphagia.     The patient underwent an EGD which revealed a 1 cm hiatal hernia, gastritis, and duodenitis. Pathology was negative for H. pylori, and duodenal biopsies showed no signs of sprue. Esophageal biopsies indicated reflux

## 2025-05-08 DIAGNOSIS — K21.9 GASTROESOPHAGEAL REFLUX DISEASE, UNSPECIFIED WHETHER ESOPHAGITIS PRESENT: ICD-10-CM

## 2025-05-08 NOTE — TELEPHONE ENCOUNTER
Name of Medication(s) Requested:  Requested Prescriptions     Pending Prescriptions Disp Refills    pantoprazole (PROTONIX) 40 MG tablet 90 tablet 1     Sig: Take 1 tablet by mouth daily       Medication is on current medication list Yes    Dosage and directions were verified? Yes    Quantity verified: 90 day supply     Pharmacy Verified?  Yes    Last Appointment:  3/18/2025    Future appts:  Future Appointments   Date Time Provider Department Center   6/4/2025  9:00 AM Rafael Solis DPM [place] [facility]   3/19/2026  1:00 PM María Elena Davenport DO [facility]   4/27/2026  1:30 PM [facility] MED[facility] LAB [facility] Med Onc [facility]   4/28/2026  1:30 PM Juan Orona MD [facility] MED ONC [facility]        (If no appt send self scheduling link. .REFILLAPPT)  Scheduling request sent?     [] Yes  [x] No    Does patient need updated?  [x] Yes  [] No

## 2025-05-09 RX ORDER — PANTOPRAZOLE SODIUM 40 MG/1
40 TABLET, DELAYED RELEASE ORAL DAILY
Qty: 90 TABLET | Refills: 1 | Status: SHIPPED | OUTPATIENT
Start: 2025-05-09

## 2025-05-12 DIAGNOSIS — Z91.89 CANDIDATE FOR STATIN THERAPY DUE TO RISK OF FUTURE CARDIOVASCULAR EVENT: ICD-10-CM

## 2025-05-12 RX ORDER — ROSUVASTATIN CALCIUM 20 MG/1
20 TABLET, COATED ORAL NIGHTLY
Qty: 90 TABLET | Refills: 1 | Status: SHIPPED | OUTPATIENT
Start: 2025-05-12

## 2025-05-12 NOTE — TELEPHONE ENCOUNTER
Name of Medication(s) Requested:  Requested Prescriptions     Pending Prescriptions Disp Refills    rosuvastatin (CRESTOR) 20 MG tablet 90 tablet 1     Sig: Take 1 tablet by mouth nightly       Medication is on current medication list Yes    Dosage and directions were verified? Yes    Quantity verified: 90 day supply     Pharmacy Verified?  Yes    Last Appointment:  3/18/2025    Future appts:  Future Appointments   Date Time Provider Department Center   6/4/2025  9:00 AM Rafael Solis DPM [place] [facility]   3/19/2026  1:00 PM María Elena Davenport DO [facility]   4/27/2026  1:30 PM [facility]C LAB [facility] Med Onc [facility]   4/28/2026  1:30 PM Juan Orona MD [facility] MED ONC [facility]        (If no appt send self scheduling link. .REFILLAPPT)  Scheduling request sent?     [] Yes  [x] No    Does patient need updated?  [x] Yes  [] No

## 2025-06-04 ENCOUNTER — OFFICE VISIT (OUTPATIENT)
Dept: PODIATRY | Age: 70
End: 2025-06-04
Payer: MEDICARE

## 2025-06-04 VITALS — BODY MASS INDEX: 24.56 KG/M2 | WEIGHT: 130 LBS

## 2025-06-04 DIAGNOSIS — L84 CORNS AND CALLOSITIES: ICD-10-CM

## 2025-06-04 DIAGNOSIS — G60.8 HEREDITARY SENSORY NEUROPATHY: ICD-10-CM

## 2025-06-04 DIAGNOSIS — Z72.0 TOBACCO ABUSE: ICD-10-CM

## 2025-06-04 DIAGNOSIS — B35.1 TINEA UNGUIUM: Primary | ICD-10-CM

## 2025-06-04 DIAGNOSIS — L60.0 INGROWN NAIL: ICD-10-CM

## 2025-06-04 PROCEDURE — 1159F MED LIST DOCD IN RCRD: CPT | Performed by: PODIATRIST

## 2025-06-04 PROCEDURE — 4004F PT TOBACCO SCREEN RCVD TLK: CPT | Performed by: PODIATRIST

## 2025-06-04 PROCEDURE — 3017F COLORECTAL CA SCREEN DOC REV: CPT | Performed by: PODIATRIST

## 2025-06-04 PROCEDURE — 99203 OFFICE O/P NEW LOW 30 MIN: CPT | Performed by: PODIATRIST

## 2025-06-04 PROCEDURE — G8427 DOCREV CUR MEDS BY ELIG CLIN: HCPCS | Performed by: PODIATRIST

## 2025-06-04 PROCEDURE — 1090F PRES/ABSN URINE INCON ASSESS: CPT | Performed by: PODIATRIST

## 2025-06-04 PROCEDURE — 11721 DEBRIDE NAIL 6 OR MORE: CPT | Performed by: PODIATRIST

## 2025-06-04 PROCEDURE — 11056 PARNG/CUTG B9 HYPRKR LES 2-4: CPT | Performed by: PODIATRIST

## 2025-06-04 PROCEDURE — G8420 CALC BMI NORM PARAMETERS: HCPCS | Performed by: PODIATRIST

## 2025-06-04 PROCEDURE — 1123F ACP DISCUSS/DSCN MKR DOCD: CPT | Performed by: PODIATRIST

## 2025-06-04 PROCEDURE — G8399 PT W/DXA RESULTS DOCUMENT: HCPCS | Performed by: PODIATRIST

## 2025-06-04 RX ORDER — AMMONIUM LACTATE 12 G/100G
LOTION TOPICAL
Qty: 400 G | Refills: 2 | Status: SHIPPED | OUTPATIENT
Start: 2025-06-04

## 2025-06-04 NOTE — PROGRESS NOTES
New patient self referral  Bilateral ingrown great toe nails  Callus tissue bilateral feet    Electronically signed by Jessica Maher LPN on 6/4/2025 at 9:09 AM      CC:   Painful elongated toenails 1-5 right and left    HPI  Presents today for new referral history of ingrown toenails.  Denies history of diabetes.  She does have a history of aspirin 81 mg daily.  Denies any open skin lesions or abrasions.  Does get some callus tissue both feet.  Does state here today primarily for painful elongated toenails 1-5 right and left.    No additional pedal complaints at this time.    ROS:  Const: Denies constitutional symptoms  Musculo: Denies symptoms other than stated above  Skin: Denies symptoms other than stated above      Current Outpatient Medications:     ammonium lactate (LAC-HYDRIN) 12 % lotion, Apply topically twice daily, Disp: 400 g, Rfl: 2    rosuvastatin (CRESTOR) 20 MG tablet, Take 1 tablet by mouth nightly, Disp: 90 tablet, Rfl: 1    pantoprazole (PROTONIX) 40 MG tablet, Take 1 tablet by mouth daily, Disp: 90 tablet, Rfl: 1    azelastine (ASTELIN) 0.1 % nasal spray, 1 spray by Nasal route 2 times daily Use in each nostril as directed, Disp: 30 mL, Rfl: 1    fluticasone (FLONASE) 50 MCG/ACT nasal spray, 2 sprays by Each Nostril route daily Shake liquid, Disp: 48 g, Rfl: 0    Multiple Vitamin (MULTIVITAMIN ADULT PO), Take 1 tablet by mouth daily, Disp: , Rfl:     Collagen-Vitamin C-Biotin (COLLAGEN PO), Take 1 tablet by mouth daily, Disp: , Rfl:     NONFORMULARY, 2 tablets daily Horse tail, Disp: , Rfl:     NONFORMULARY, 1 tablet daily OTC thyroid supplement, Disp: , Rfl:     aspirin 81 MG EC tablet, Take 1 tablet by mouth daily, Disp: 90 tablet, Rfl: 0    vitamin D3 (CHOLECALCIFEROL) 25 MCG (1000 UT) TABS tablet, Take 1 tablet by mouth daily, Disp: 30 tablet, Rfl: 2    ibuprofen (ADVIL;MOTRIN) 800 MG tablet, Take 1 tablet by mouth daily as needed for Pain (right hip pain), Disp: 90 tablet, Rfl: 0

## 2025-06-11 ENCOUNTER — CLINICAL DOCUMENTATION (OUTPATIENT)
Facility: HOSPITAL | Age: 70
End: 2025-06-11

## 2025-06-11 NOTE — PROGRESS NOTES
Faxed appeal to [facility] [phone] for Prolia.  Fax Number provided by infusion intake.    RE: Radha Nunez  Member  [birth year]  Member ID: [id number]  DOS: 25 - 2026  Every 6 month  Visits: 2   Provider: Lesli Cheung D.O.  Medication:  Denosumab   60 mg, SQ injection  Reference to Denial dated 25.     Contacted Patient Doreen [phone] Left message to return call.    Follow Up Plan:  Monitor Appeal Status    Electronically signed by BERNA MCALLISTER, RN on 25 at 3:34 PM

## 2025-06-16 ENCOUNTER — CLINICAL DOCUMENTATION (OUTPATIENT)
Facility: HOSPITAL | Age: 70
End: 2025-06-16

## 2025-06-16 NOTE — PROGRESS NOTES
Contacted [facility] [phone] and spoke with Chuckie ALFARO    Action Items Completed:   Follow up to appeal filed on 6/11/25.   Appeal ID [id number]  Auth Number:  [id number]  Status: Still under review  Determination Date:  30 calendar days (July 11th)    Contacted Provider office of Dr Cheung and spoke with Bianca. Notified of above.   Contacted Patient Doreen [phone]  on  to return call.     Call reference number: [id number]    Follow Up Action:   Check on Referral next week.     Electronically signed by BERNA MCALLISTER, RN on 6/16/25 at 10:59 AM

## 2025-06-18 ENCOUNTER — CLINICAL DOCUMENTATION (OUTPATIENT)
Facility: HOSPITAL | Age: 70
End: 2025-06-18

## 2025-06-18 NOTE — PROGRESS NOTES
Doreen Nunez  MRN: [id number]  Referral: [id number]    Action Items Completed:   Fax received from [employer].  Authorization received for Denosumab 1mg  Auth Number: [id number]   DOS 4/18/2025 - 4/18/2026    Auth in Media Tab.    Contacted Patient Radha [phone] and notified of above and aware Scheduling will be reaching to her. Voiced Understanding.  Contacted Provider office of Dr. Cheung. [phone], and spoke with Anna, notified of above.  Emailed [facility] and notified of authorization for scheduling.     Referral:  Authorized, covered Benefit.     Electronically signed by ANNA MCALLISTER, RN on 6/18/2025 at 8:42 AM

## 2025-06-30 ENCOUNTER — CLINICAL DOCUMENTATION (OUTPATIENT)
Facility: HOSPITAL | Age: 70
End: 2025-06-30

## 2025-06-30 ENCOUNTER — TELEPHONE (OUTPATIENT)
Dept: FAMILY MEDICINE CLINIC | Age: 70
End: 2025-06-30

## 2025-06-30 NOTE — PROGRESS NOTES
Contacted Dr Cheung's office [phone] and spoke with Elin  I received message from Leora VANEGAS with Coding. Stated patient's prolia was discontinued  4/2025.  However, I was able to obtain Authorization for the medication.      Elin states Dr. Chenug is not longer with the practice.    Will have another provider review and see if Prolia is still needed.  She will let me know the decision - continue DC or re-initiate Prolia order.     Follow Up Action:   Awaiting for Provider to review Treatment Plan.     Electronically signed by BERNA MCALLISTER, RN on 6/30/25 at 1:25 PM

## 2025-06-30 NOTE — TELEPHONE ENCOUNTER
Anna nurse navigator from infusion calling regarding pts prolia injection. She was able to get this approved with insurance, she just needs to know if pt still needs to get prolia injection & they can get her scheduled. Please advise asap!       Anna can be reached @ [phone]

## 2025-07-02 ENCOUNTER — CLINICAL DOCUMENTATION (OUTPATIENT)
Facility: HOSPITAL | Age: 70
End: 2025-07-02

## 2025-07-02 NOTE — TELEPHONE ENCOUNTER
As per Dr Cheung's note, patient has upcoming jaw surgery hence it was stopped due to it's potential side effect of jaw necrosis. Looks like Dr Cheung and patient had conversation and mutually decided to stop the medication.  Patient can discuss alternatives, if there should be need for any, when following up with PCP.   Thanks !

## 2025-07-02 NOTE — PROGRESS NOTES
Communication from Elin ISAAC at Dr. Cheung's' office: RE Prolia Referral     As per Dr Cheung's note, patient has upcoming jaw surgery hence it was stopped due to it's potential side effect of jaw necrosis. Looks like Dr Cheung and patient had conversation and mutually decided to stop the medication.  Patient can discuss alternatives, if there should be need for any, when following up with PCP.     Scheduling group and Infusion Intake Team notified of above via Epic Messaging.     Closing Referral    Electronically signed by BERNA MCALLISTER, RN on 7/2/25 at 10:18 AM

## 2025-07-31 DIAGNOSIS — J44.9 CHRONIC OBSTRUCTIVE PULMONARY DISEASE, UNSPECIFIED COPD TYPE (HCC): ICD-10-CM

## 2025-07-31 DIAGNOSIS — H90.6 MIXED CONDUCTIVE AND SENSORINEURAL HEARING LOSS OF BOTH EARS: ICD-10-CM

## 2025-08-02 RX ORDER — ALBUTEROL SULFATE 90 UG/1
2 INHALANT RESPIRATORY (INHALATION) EVERY 6 HOURS PRN
Qty: 18 G | Refills: 2 | Status: SHIPPED | OUTPATIENT
Start: 2025-08-02

## (undated) DEVICE — FORCEPS BX OVL CUP FEN DISPOSABLE CAP L 160CM RAD JAW 4

## (undated) DEVICE — BLOCK BITE 60FR RUBBER ADLT DENTAL

## (undated) DEVICE — SPONGE GZ W4XL4IN RAYON POLY CVR W/NONWOVEN FAB STRL 2/PK

## (undated) DEVICE — GRADUATE TRIANG MEASURE 1000ML BLK PRNT

## (undated) DEVICE — GLOVE,SURG,SIGNATURE LTX MICR,LTX,PF,7.0: Brand: MEDLINE